# Patient Record
Sex: FEMALE | Race: WHITE | Employment: PART TIME | URBAN - METROPOLITAN AREA
[De-identification: names, ages, dates, MRNs, and addresses within clinical notes are randomized per-mention and may not be internally consistent; named-entity substitution may affect disease eponyms.]

---

## 2020-07-26 ENCOUNTER — HOSPITAL ENCOUNTER (EMERGENCY)
Age: 50
Discharge: HOME | End: 2020-07-26
Payer: COMMERCIAL

## 2020-07-26 VITALS
HEART RATE: 86 BPM | SYSTOLIC BLOOD PRESSURE: 132 MMHG | DIASTOLIC BLOOD PRESSURE: 80 MMHG | OXYGEN SATURATION: 98 % | TEMPERATURE: 98.7 F | RESPIRATION RATE: 16 BRPM

## 2020-07-26 PROCEDURE — 99282 EMERGENCY DEPT VISIT SF MDM: CPT

## 2020-07-26 RX ORDER — PREDNISONE 20 MG/1
TABLET ORAL
Qty: 18 TABLET | Refills: 0 | Status: SHIPPED | OUTPATIENT
Start: 2020-07-26 | End: 2020-08-05

## 2020-07-26 ASSESSMENT — PAIN DESCRIPTION - LOCATION: LOCATION: OTHER (COMMENT)

## 2020-07-26 ASSESSMENT — PAIN SCALES - GENERAL: PAINLEVEL_OUTOF10: 10

## 2020-07-26 ASSESSMENT — PAIN DESCRIPTION - PAIN TYPE: TYPE: ACUTE PAIN

## 2020-07-26 ASSESSMENT — PAIN DESCRIPTION - DESCRIPTORS: DESCRIPTORS: BURNING;THROBBING

## 2020-07-26 ASSESSMENT — PAIN DESCRIPTION - FREQUENCY: FREQUENCY: CONTINUOUS

## 2020-07-26 NOTE — ED PROVIDER NOTES
[facility]     Department of Emergency Medicine   ED  Provider Note  Admit Date/RoomTime: 7/26/2020  1:18 PM  ED Room: 03/03  Chief Complaint   Rash (bilateral hands and feet x 6 months)    History of Present Illness   Source of history provided by:  patient. History/Exam Limitations: none. Rylee Lutz is a 51 y.o. old female with has a past medical history of: History reviewed. No pertinent past medical history. presents to the emergency department by private vehicle, for complaint of gradual onset, still present scaly erythematous itchy rash area on palms and soles of the feet which began a few month(s) prior to arrival.  The symptoms were caused by unknown cause and appears to be seasonal..  Since onset the symptoms have been persistent. Prior history of similar episodes: Yes. Her symptoms are associated with nothing additional and not relieved by topical creams alone. .  She denies any difficulty breathing, difficulty swallowing, wheezing, throat tightness, stridor or itching. ROS    Pertinent positives and negatives are stated within HPI, all other systems reviewed and are negative. Past Surgical History:   Procedure Laterality Date    THROAT SURGERY      nodule removed   Social History:  reports that she has been smoking cigarettes. She has been smoking about 1.50 packs per day. She has never used smokeless tobacco. She reports current drug use. Drug: Marijuana. She reports that she does not drink alcohol. Family History: family history is not on file. Allergies: Pcn [penicillins]    Physical Exam           ED Triage Vitals [07/26/20 1314]   BP Temp Temp Source Pulse Resp SpO2 Height Weight   132/80 98.7 °F (37.1 °C) Temporal 86 16 98 % -- --     Oxygen Saturation Interpretation: Normal.     Constitutional:  Alert, development consistent with age. HEENT:  NC/NT. Airway patent. Eyes:  PERRL, EOMI, no discharge. Ears:  TMs without perforation, injection, or bulging.   External (DELTASONE) 20 MG TABLET    Sig.: Take 60mg  Po qd x 3 days, then 40mg po qd x3 days, then 20mg po qd x 3 days. QS x 9 days     Electronically signed by ISABELLE Lockett   DD: 7/26/20  **This report was transcribed using voice recognition software. Every effort was made to ensure accuracy; however, inadvertent computerized transcription errors may be present.   END OF ED PROVIDER NOTE       Yinka Ward, [address]  07/26/20 6917

## 2020-12-09 ENCOUNTER — HOSPITAL ENCOUNTER (EMERGENCY)
Age: 50
Discharge: HOME | End: 2020-12-09
Payer: COMMERCIAL

## 2020-12-09 VITALS
HEIGHT: 59 IN | RESPIRATION RATE: 18 BRPM | TEMPERATURE: 96.3 F | WEIGHT: 154 LBS | SYSTOLIC BLOOD PRESSURE: 164 MMHG | DIASTOLIC BLOOD PRESSURE: 90 MMHG | OXYGEN SATURATION: 98 % | HEART RATE: 93 BPM | BODY MASS INDEX: 31.04 KG/M2

## 2020-12-09 LAB — HCG(URINE) PREGNANCY TEST: NEGATIVE

## 2020-12-09 PROCEDURE — 81025 URINE PREGNANCY TEST: CPT

## 2020-12-09 PROCEDURE — 96372 THER/PROPH/DIAG INJ SC/IM: CPT

## 2020-12-09 PROCEDURE — 99283 EMERGENCY DEPT VISIT LOW MDM: CPT

## 2020-12-09 PROCEDURE — 6370000000 HC RX 637 (ALT 250 FOR IP): Performed by: NURSE PRACTITIONER

## 2020-12-09 PROCEDURE — 6360000002 HC RX W HCPCS: Performed by: NURSE PRACTITIONER

## 2020-12-09 RX ORDER — CLINDAMYCIN HYDROCHLORIDE 150 MG/1
150 CAPSULE ORAL ONCE
Status: COMPLETED | OUTPATIENT
Start: 2020-12-09 | End: 2020-12-09

## 2020-12-09 RX ORDER — IBUPROFEN 600 MG/1
600 TABLET ORAL EVERY 8 HOURS PRN
Qty: 15 TABLET | Refills: 0 | Status: SHIPPED | OUTPATIENT
Start: 2020-12-09 | End: 2021-06-07

## 2020-12-09 RX ORDER — HYDROCODONE BITARTRATE AND ACETAMINOPHEN 5; 325 MG/1; MG/1
1 TABLET ORAL EVERY 8 HOURS PRN
Qty: 6 TABLET | Refills: 0 | Status: SHIPPED | OUTPATIENT
Start: 2020-12-09 | End: 2020-12-11

## 2020-12-09 RX ORDER — CHLORHEXIDINE GLUCONATE 0.12 MG/ML
15 RINSE ORAL 2 TIMES DAILY
Qty: 473 ML | Refills: 0 | Status: SHIPPED | OUTPATIENT
Start: 2020-12-09 | End: 2021-06-06

## 2020-12-09 RX ORDER — KETOROLAC TROMETHAMINE 30 MG/ML
30 INJECTION, SOLUTION INTRAMUSCULAR; INTRAVENOUS ONCE
Status: COMPLETED | OUTPATIENT
Start: 2020-12-09 | End: 2020-12-09

## 2020-12-09 RX ORDER — CLINDAMYCIN HYDROCHLORIDE 300 MG/1
300 CAPSULE ORAL 3 TIMES DAILY
Qty: 21 CAPSULE | Refills: 0 | Status: SHIPPED | OUTPATIENT
Start: 2020-12-09 | End: 2020-12-16

## 2020-12-09 RX ADMIN — CLINDAMYCIN HYDROCHLORIDE 150 MG: 150 CAPSULE ORAL at 20:45

## 2020-12-09 RX ADMIN — KETOROLAC TROMETHAMINE 30 MG: 30 INJECTION, SOLUTION INTRAMUSCULAR at 20:45

## 2020-12-09 ASSESSMENT — PAIN DESCRIPTION - DESCRIPTORS: DESCRIPTORS: ACHING

## 2020-12-09 ASSESSMENT — PAIN DESCRIPTION - ORIENTATION: ORIENTATION: RIGHT

## 2020-12-09 ASSESSMENT — PAIN DESCRIPTION - PAIN TYPE: TYPE: ACUTE PAIN

## 2020-12-09 ASSESSMENT — PAIN SCALES - GENERAL
PAINLEVEL_OUTOF10: 9
PAINLEVEL_OUTOF10: 10

## 2020-12-09 ASSESSMENT — PAIN DESCRIPTION - LOCATION: LOCATION: TEETH

## 2020-12-09 ASSESSMENT — PAIN DESCRIPTION - PROGRESSION: CLINICAL_PROGRESSION: GRADUALLY WORSENING

## 2020-12-09 ASSESSMENT — PAIN DESCRIPTION - FREQUENCY: FREQUENCY: CONTINUOUS

## 2020-12-10 NOTE — ED PROVIDER NOTES
[address] [facility]  Department of Emergency Medicine   ED  Encounter Note  Admit Date/RoomTime: 2020  7:44 PM  ED Room:     NAME: Tangela Irizarry  : 1970  MRN: [id number]     Chief Complaint:  Dental Pain (X 2 days to right upper, states her tooth broke off last week but states she did not have pain at that time.  )    History of Present Illness        Barbara Kay is a 51 y.o. old female who presents to the emergency department for complaint of right upper dental pain. She reports that her tooth broke 1 week ago and over the past 2 days she has had increased pain. No fever or chills. No nausea or vomiting. Denies dysphagia. She reports that she has been using ibuprofen with no improvement. Denies any recent use of antibiotics. Reports that she does not have a private dentist to see. No sore throat, no neck pain or stiffness, no chest pain or shortness of breath. Since onset the symptoms have been waxing and waning and mild-moderate in severity. Worsened by  chewing and improved by nothing. Associated Signs & Symptoms:  Facial pain. Onset:       Spontaneous:   no.     Following Trauma:   no.     Previous Caries:   yes. Recent Dental Procedure:   no.     ROS   Pertinent positives and negatives are stated within HPI, all other systems reviewed and are negative. Past Medical History:  has no past medical history on file. Surgical History:  has a past surgical history that includes Throat surgery. Social History:  reports that she has been smoking cigarettes. She has been smoking about 1.50 packs per day. She has never used smokeless tobacco. She reports current drug use. Drug: Marijuana. She reports that she does not drink alcohol. Family History: family history is not on file.      Allergies: Pcn [penicillins]    Physical Exam   Oxygen Saturation Interpretation: Normal.        ED Triage Vitals   BP Temp Temp Source Pulse Resp obvious abscess formation. No trismus or concern for Stephen angina. No fever or chills. No nausea or vomiting. She was started on clindamycin due to penicillin allergy. She was given dental clinic contact information and instructed to follow-up as a walk-in. She verbalized understanding agrees with plan. She remained hemodynamically stable, nontoxic, and appropriate for outpatient management. Is to return for any new, change, worsening symptoms or concerns. At this time the patient is without objective evidence of an acute process requiring hospitalization or inpatient management. They have remained hemodynamically stable throughout their entire ED visit and are stable for discharge with outpatient follow-up. The plan has been discussed in detail and they are aware of the specific conditions for emergent return, as well as the importance of follow-up. Counseling:   I discussed todays visit summary with patient,  in addition to providing specific details for the plan of care and counseling regarding the diagnosis and prognosis. Questions are answered at this time and they are agreeable with the plan. Assessment      1. Pain, dental    2. Dental caries      Plan   Discharge to home. Patient condition is good    New Medications     New Prescriptions    No medications on file     Electronically signed by TODD Lynch CNP   DD: 12/9/20  **This report was transcribed using voice recognition software. Every effort was made to ensure accuracy; however, inadvertent computerized transcription errors may be present.   END OF ED PROVIDER NOTE      TODD Lynch CNP  12/09/20 4730

## 2021-06-06 ENCOUNTER — APPOINTMENT (OUTPATIENT)
Dept: GENERAL RADIOLOGY | Age: 51
DRG: 024 | End: 2021-06-06
Payer: COMMERCIAL

## 2021-06-06 ENCOUNTER — APPOINTMENT (OUTPATIENT)
Dept: CT IMAGING | Age: 51
DRG: 024 | End: 2021-06-06
Payer: COMMERCIAL

## 2021-06-06 ENCOUNTER — HOSPITAL ENCOUNTER (EMERGENCY)
Age: 51
Discharge: HOME | DRG: 024 | End: 2021-06-06
Attending: EMERGENCY MEDICINE
Payer: COMMERCIAL

## 2021-06-06 VITALS
HEART RATE: 96 BPM | TEMPERATURE: 97.7 F | BODY MASS INDEX: 32.25 KG/M2 | RESPIRATION RATE: 16 BRPM | OXYGEN SATURATION: 100 % | DIASTOLIC BLOOD PRESSURE: 98 MMHG | SYSTOLIC BLOOD PRESSURE: 182 MMHG | WEIGHT: 160 LBS | HEIGHT: 59 IN

## 2021-06-06 DIAGNOSIS — I10 HYPERTENSION, UNSPECIFIED TYPE: ICD-10-CM

## 2021-06-06 DIAGNOSIS — I63.02 CEREBROVASCULAR ACCIDENT (CVA) DUE TO THROMBOSIS OF BASILAR ARTERY (HCC): Primary | ICD-10-CM

## 2021-06-06 DIAGNOSIS — I77.9 CAROTID ARTERY DISEASE, UNSPECIFIED LATERALITY (HCC): ICD-10-CM

## 2021-06-06 DIAGNOSIS — D68.9 COAGULOPATHY (HCC): ICD-10-CM

## 2021-06-06 LAB
ALBUMIN SERPL-MCNC: 4.3 G/DL (ref 3.5–5.2)
ALP BLD-CCNC: 82 U/L (ref 35–104)
ALT SERPL-CCNC: 16 U/L (ref 0–32)
AMYLASE: 53 U/L (ref 20–100)
ANION GAP SERPL CALCULATED.3IONS-SCNC: 12 MMOL/L (ref 7–16)
AST SERPL-CCNC: 14 U/L (ref 0–31)
BILIRUB SERPL-MCNC: 0.2 MG/DL (ref 0–1.2)
BILIRUBIN DIRECT: 0.2 MG/DL (ref 0–0.3)
BILIRUBIN, INDIRECT: 0 MG/DL (ref 0–1)
BUN BLDV-MCNC: 11 MG/DL (ref 6–20)
CALCIUM SERPL-MCNC: 9.3 MG/DL (ref 8.6–10.2)
CHLORIDE BLD-SCNC: 105 MMOL/L (ref 98–107)
CK MB: 1 NG/ML (ref 0–4.3)
CO2: 23 MMOL/L (ref 22–29)
CREAT SERPL-MCNC: 0.6 MG/DL (ref 0.5–1)
GFR AFRICAN AMERICAN: >60
GFR NON-AFRICAN AMERICAN: >60 ML/MIN/1.73
GLUCOSE BLD-MCNC: 104 MG/DL (ref 74–99)
HCT VFR BLD CALC: 48.1 % (ref 34–48)
HEMOGLOBIN: 16.7 G/DL (ref 11.5–15.5)
INR BLD: >10
LIPASE: 16 U/L (ref 13–60)
MAGNESIUM: 1.9 MG/DL (ref 1.6–2.6)
MCH RBC QN AUTO: 31.2 PG (ref 26–35)
MCHC RBC AUTO-ENTMCNC: 34.7 % (ref 32–34.5)
MCV RBC AUTO: 89.7 FL (ref 80–99.9)
METER GLUCOSE: 110 MG/DL (ref 74–99)
PDW BLD-RTO: 12.2 FL (ref 11.5–15)
PLATELET # BLD: 235 E9/L (ref 130–450)
PMV BLD AUTO: 10.7 FL (ref 7–12)
POTASSIUM SERPL-SCNC: 3.8 MMOL/L (ref 3.5–5)
PRO-BNP: 145 PG/ML (ref 0–125)
PROTHROMBIN TIME: >120 SEC (ref 9.3–12.4)
RBC # BLD: 5.36 E12/L (ref 3.5–5.5)
SODIUM BLD-SCNC: 140 MMOL/L (ref 132–146)
TOTAL CK: 70 U/L (ref 20–180)
TOTAL PROTEIN: 8 G/DL (ref 6.4–8.3)
TROPONIN, HIGH SENSITIVITY: <6 NG/L (ref 0–9)
WBC # BLD: 12.1 E9/L (ref 4.5–11.5)

## 2021-06-06 PROCEDURE — 2580000003 HC RX 258: Performed by: RADIOLOGY

## 2021-06-06 PROCEDURE — 70498 CT ANGIOGRAPHY NECK: CPT

## 2021-06-06 PROCEDURE — 71045 X-RAY EXAM CHEST 1 VIEW: CPT

## 2021-06-06 PROCEDURE — 93005 ELECTROCARDIOGRAM TRACING: CPT | Performed by: EMERGENCY MEDICINE

## 2021-06-06 PROCEDURE — 70496 CT ANGIOGRAPHY HEAD: CPT

## 2021-06-06 PROCEDURE — 6360000004 HC RX CONTRAST MEDICATION: Performed by: RADIOLOGY

## 2021-06-06 PROCEDURE — 99283 EMERGENCY DEPT VISIT LOW MDM: CPT

## 2021-06-06 RX ORDER — SODIUM CHLORIDE 0.9 % (FLUSH) 0.9 %
10 SYRINGE (ML) INJECTION PRN
Status: COMPLETED | OUTPATIENT
Start: 2021-06-06 | End: 2021-06-06

## 2021-06-06 RX ADMIN — SODIUM CHLORIDE, PRESERVATIVE FREE 10 ML: 5 INJECTION INTRAVENOUS at 20:24

## 2021-06-06 RX ADMIN — IOPAMIDOL 75 ML: 755 INJECTION, SOLUTION INTRAVENOUS at 20:24

## 2021-06-06 ASSESSMENT — ENCOUNTER SYMPTOMS
BACK PAIN: 0
EYE REDNESS: 0
COUGH: 0
VOMITING: 0
EYE DISCHARGE: 0
EYE PAIN: 0
ABDOMINAL DISTENTION: 0
SINUS PRESSURE: 0
WHEEZING: 0
DIARRHEA: 0
SHORTNESS OF BREATH: 0
NAUSEA: 0
SORE THROAT: 0

## 2021-06-06 NOTE — ED PROVIDER NOTES
PATIENT HAD SLURRED SPEECH GREATER THAN 24 HOURS AGO COMPLETELY RESOLVED NOW NO HEADACHE NO FEVER NO VOMITING SMOKES NO ALCOHOL    The history is provided by the patient. Review of Systems   Constitutional: Negative for chills and fever. HENT: Negative for ear pain, sinus pressure and sore throat. Eyes: Negative for pain, discharge and redness. Respiratory: Negative for cough, shortness of breath and wheezing. Cardiovascular: Negative for chest pain. Gastrointestinal: Negative for abdominal distention, diarrhea, nausea and vomiting. Genitourinary: Negative for dysuria and frequency. Musculoskeletal: Negative for arthralgias and back pain. Skin: Negative for rash and wound. Neurological: Positive for speech difficulty. Negative for weakness and headaches. Hematological: Negative for adenopathy. All other systems reviewed and are negative. Physical Exam  Vitals and nursing note reviewed. Constitutional:       Appearance: She is well-developed. HENT:      Head: Normocephalic and atraumatic. Eyes:      Pupils: Pupils are equal, round, and reactive to light. Cardiovascular:      Rate and Rhythm: Normal rate and regular rhythm. Heart sounds: Normal heart sounds. No murmur heard. Pulmonary:      Effort: Pulmonary effort is normal. No respiratory distress. Breath sounds: Normal breath sounds. No wheezing or rales. Abdominal:      General: Bowel sounds are normal.      Palpations: Abdomen is soft. Tenderness: There is no abdominal tenderness. There is no guarding or rebound. Musculoskeletal:      Cervical back: Normal range of motion and neck supple. Skin:     General: Skin is warm and dry. Neurological:      Mental Status: She is alert and oriented to person, place, and time.           Procedures     [facility]         --------------------------------------------- PAST HISTORY ---------------------------------------------  Past Medical History:  has no past mg/dL   Lipase   Result Value Ref Range    Lipase 16 13 - 60 U/L   Amylase   Result Value Ref Range    Amylase 53 20 - 100 U/L   Magnesium   Result Value Ref Range    Magnesium 1.9 1.6 - 2.6 mg/dL   Brain Natriuretic Peptide   Result Value Ref Range    Pro- (H) 0 - 125 pg/mL   Protime-INR   Result Value Ref Range    Protime >120.0 (H) 9.3 - 12.4 sec    INR >10.0 (HH)    Serum Drug Screen   Result Value Ref Range    Ethanol Lvl <10 mg/dL    Acetaminophen Level <5.0 (L) 10.0 - 33.8 mcg/mL    Salicylate, Serum <5.4 0.0 - 30.0 mg/dL   POCT Glucose   Result Value Ref Range    Meter Glucose 110 (H) 74 - 99 mg/dL       RADIOLOGY:  XR CHEST PORTABLE   Final Result   No pneumonia or pleural effusion. CTA HEAD W CONTRAST   Final Result   1. No acute intracranial hemorrhage. 2.  Lacunar areas of stroke involving head of the caudate nucleus on the left   and left basal ganglia of uncertain chronicity. MRI brain may be helpful for   further evaluation. 3.  Severe focal stenosis at origin of left internal carotid artery of 90% or   greater. CTA NECK W CONTRAST   Final Result   1. No acute intracranial hemorrhage. 2.  Lacunar areas of stroke involving head of the caudate nucleus on the left   and left basal ganglia of uncertain chronicity. MRI brain may be helpful for   further evaluation. 3.  Severe focal stenosis at origin of left internal carotid artery of 90% or   greater. EKG:  This EKG is signed and interpreted by me. Rate: 65  Rhythm: Sinus  Interpretation: no acute changes  Comparison: no previous EKG available      ------------------------- NURSING NOTES AND VITALS REVIEWED ---------------------------   The nursing notes within the ED encounter and vital signs as below have been reviewed.    BP (!) 182/98   Pulse 96   Temp 97.7 °F (36.5 °C) (Infrared)   Resp 16   Ht 4' 11\" (1.499 m)   Wt 160 lb (72.6 kg)   SpO2 100%   BMI 32.32 kg/m²   Oxygen Saturation Interpretation: Normal      ------------------------------------------ PROGRESS NOTES ------------------------------------------     Consultations:      Counseling:   I have spoken with the patient and discussed todays results, in addition to providing specific details for the plan of care and counseling regarding the diagnosis and prognosis. Their questions are answered at this time and they are agreeable with the plan.      --------------------------------- ADDITIONAL PROVIDER NOTES ---------------------------------     This patient has chosen to leave against medical advice. I have personally explained to them that choosing to do so may result in permanent bodily harm or death. I discussed at length that without further evaluation and monitoring there may be unforeseen circumstances and deterioration resulting in permanent bodily harm or death as a result of their choice. They are alert, oriented, and competent at this time. They state that they are aware of the serious risks as explained, but they continue to wish to leave against medical advice. In light of their decision to leave against medical advice, follow-up has been arranged and they are aware of the importance of following up as instructed. They have been advised that they should return to the ED immediately if they change their mind at any time, or if their condition begins to change or worsen. PATIENT DID NOT WANT TO WAIT FOR REPEAT INR    This patient's ED course included: a personal history and physicial examination, re-evaluation prior to disposition, multiple bedside re-evaluations, IV medications, cardiac monitoring, continuous pulse oximetry and complex medical decision making and emergency management    This patient has remained hemodynamically stable during their ED course. This patient has chosen to leave against medical advice.   I have personally explained to them that choosing to do so may result in permanent bodily harm or death.  I discussed at length that without further evaluation and monitoring there may be unforeseen circumstances and deterioration resulting in permanent bodily harm or death as a result of their choice. They are alert, oriented, and competent at this time. They state that they are aware of the serious risks as explained, but they continue to wish to leave against medical advice. In light of their decision to leave against medical advice, follow-up has been arranged and they are aware of the importance of following up as instructed. They have been advised that they should return to the ED immediately if they change their mind at any time, or if their condition begins to change or worsen. TOLD HAD STROKE AND CAROTID BLOCKAGE LEXI RISK OF SEVERE MORBIDITY MORTALITY A AND O TIMES 3 SOBER ACCEPTS ALL RISK  TOLD CAN  AND SHOULD BE ADMITTED ASAP. TOLD NOT TO DRIVE ALSO DID NOT WANT TO WAIT FOR REPEA INR  Critical Care: 35  NIH SCORE 0 and greater than 24 hours ago not a TPA candidate     --------------------------------- IMPRESSION AND DISPOSITION ---------------------------------    IMPRESSION  1. 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 Carotid artery disease, unspecified laterality ([facility]    3. Hypertension, unspecified type    4.  [facility]        DISPOSITION  Disposition: Other Disposition: Left AMA  Patient condition is stable                Nancy Rowland MD  06/06/21 2036

## 2021-06-06 NOTE — ED NOTES
Name: Beverley Watson  : 1970  MRN: [id number]    Date: 2021    Benefits of immediately proceeding with Radiology exam outweigh the risks and therefore the following is being waived:      [x] Pregnancy test    [] Protocol for Iodine allergy    [] MRI questionnaire    [x] BUN/Creatinine        MD Nancy Mckenzie MD  21 6901

## 2021-06-06 NOTE — ED NOTES
[facility] Carolin Lee, [facility]  06/06/21 6747

## 2021-06-07 LAB
ABO/RH: NORMAL
ACETAMINOPHEN LEVEL: <5 MCG/ML (ref 10–30)
ALBUMIN SERPL-MCNC: 4.3 G/DL (ref 3.5–5.2)
ALP BLD-CCNC: 81 U/L (ref 35–104)
ALT SERPL-CCNC: 17 U/L (ref 0–32)
AMPHETAMINE SCREEN, URINE: NOT DETECTED
ANION GAP SERPL CALCULATED.3IONS-SCNC: 12 MMOL/L (ref 7–16)
ANTIBODY SCREEN: NORMAL
APTT: 31.4 SEC (ref 24.5–35.1)
AST SERPL-CCNC: 14 U/L (ref 0–31)
BARBITURATE SCREEN URINE: NOT DETECTED
BENZODIAZEPINE SCREEN, URINE: NOT DETECTED
BILIRUB SERPL-MCNC: 0.3 MG/DL (ref 0–1.2)
BUN BLDV-MCNC: 8 MG/DL (ref 6–20)
CALCIUM SERPL-MCNC: 9.7 MG/DL (ref 8.6–10.2)
CANNABINOID SCREEN URINE: POSITIVE
CHLORIDE BLD-SCNC: 101 MMOL/L (ref 98–107)
CO2: 25 MMOL/L (ref 22–29)
COCAINE METABOLITE SCREEN URINE: NOT DETECTED
CREAT SERPL-MCNC: 0.6 MG/DL (ref 0.5–1)
EKG ATRIAL RATE: 96 BPM
EKG P AXIS: 30 DEGREES
EKG P-R INTERVAL: 156 MS
EKG Q-T INTERVAL: 350 MS
EKG QRS DURATION: 70 MS
EKG QTC CALCULATION (BAZETT): 442 MS
EKG R AXIS: 7 DEGREES
EKG T AXIS: 32 DEGREES
EKG VENTRICULAR RATE: 96 BPM
ETHANOL: <10 MG/DL (ref 0–0.08)
FENTANYL SCREEN, URINE: NOT DETECTED
GFR AFRICAN AMERICAN: >60
GFR NON-AFRICAN AMERICAN: >60 ML/MIN/1.73
GLUCOSE BLD-MCNC: 188 MG/DL (ref 74–99)
HCT VFR BLD CALC: 48.2 % (ref 34–48)
HEMOGLOBIN: 16.4 G/DL (ref 11.5–15.5)
INR BLD: 1.1
Lab: ABNORMAL
MCH RBC QN AUTO: 30.8 PG (ref 26–35)
MCHC RBC AUTO-ENTMCNC: 34 % (ref 32–34.5)
MCV RBC AUTO: 90.4 FL (ref 80–99.9)
METHADONE SCREEN, URINE: NOT DETECTED
OPIATE SCREEN URINE: NOT DETECTED
OXYCODONE URINE: NOT DETECTED
PDW BLD-RTO: 12.2 FL (ref 11.5–15)
PHENCYCLIDINE SCREEN URINE: NOT DETECTED
PLATELET # BLD: 238 E9/L (ref 130–450)
PMV BLD AUTO: 11.1 FL (ref 7–12)
POTASSIUM SERPL-SCNC: 3.5 MMOL/L (ref 3.5–5)
PROTHROMBIN TIME: 12.4 SEC (ref 9.3–12.4)
RBC # BLD: 5.33 E12/L (ref 3.5–5.5)
SALICYLATE, SERUM: <0.3 MG/DL (ref 0–30)
SODIUM BLD-SCNC: 138 MMOL/L (ref 132–146)
TOTAL PROTEIN: 7.8 G/DL (ref 6.4–8.3)
TRICYCLIC ANTIDEPRESSANTS SCREEN SERUM: NEGATIVE NG/ML
TROPONIN, HIGH SENSITIVITY: <6 NG/L (ref 0–9)
WBC # BLD: 12.5 E9/L (ref 4.5–11.5)

## 2021-06-07 PROCEDURE — 85730 THROMBOPLASTIN TIME PARTIAL: CPT

## 2021-06-07 PROCEDURE — 93010 ELECTROCARDIOGRAM REPORT: CPT | Performed by: INTERNAL MEDICINE

## 2021-06-07 PROCEDURE — 86850 RBC ANTIBODY SCREEN: CPT

## 2021-06-07 PROCEDURE — 93005 ELECTROCARDIOGRAM TRACING: CPT | Performed by: NURSE PRACTITIONER

## 2021-06-07 PROCEDURE — 84484 ASSAY OF TROPONIN QUANT: CPT

## 2021-06-07 PROCEDURE — 85610 PROTHROMBIN TIME: CPT

## 2021-06-07 PROCEDURE — 80053 COMPREHEN METABOLIC PANEL: CPT

## 2021-06-07 PROCEDURE — 86900 BLOOD TYPING SEROLOGIC ABO: CPT

## 2021-06-07 PROCEDURE — 85027 COMPLETE CBC AUTOMATED: CPT

## 2021-06-07 PROCEDURE — 86901 BLOOD TYPING SEROLOGIC RH(D): CPT

## 2021-06-07 ASSESSMENT — ENCOUNTER SYMPTOMS
ABDOMINAL DISTENTION: 0
VOMITING: 0
ABDOMINAL PAIN: 0
COUGH: 0
PHOTOPHOBIA: 0
CHEST TIGHTNESS: 0
NAUSEA: 0
DIARRHEA: 0
SHORTNESS OF BREATH: 0

## 2021-06-07 NOTE — ED NOTES
Instructions provided and questions answered. Iv disconnected, site wnl.        Elizabeth Gardner RN  06/06/21 1446

## 2021-06-08 ENCOUNTER — APPOINTMENT (OUTPATIENT)
Dept: CT IMAGING | Age: 51
DRG: 024 | End: 2021-06-08
Payer: COMMERCIAL

## 2021-06-08 ENCOUNTER — HOSPITAL ENCOUNTER (INPATIENT)
Age: 51
LOS: 2 days | Discharge: HOME HEALTH | DRG: 024 | End: 2021-06-11
Attending: EMERGENCY MEDICINE | Admitting: INTERNAL MEDICINE
Payer: COMMERCIAL

## 2021-06-08 DIAGNOSIS — G89.18 ACUTE POSTOPERATIVE PAIN: ICD-10-CM

## 2021-06-08 DIAGNOSIS — I63.9 CEREBROVASCULAR ACCIDENT (CVA), UNSPECIFIED MECHANISM (HCC): Primary | ICD-10-CM

## 2021-06-08 LAB
EKG ATRIAL RATE: 103 BPM
EKG P AXIS: 65 DEGREES
EKG P-R INTERVAL: 158 MS
EKG Q-T INTERVAL: 338 MS
EKG QRS DURATION: 72 MS
EKG QTC CALCULATION (BAZETT): 442 MS
EKG R AXIS: 47 DEGREES
EKG T AXIS: 45 DEGREES
EKG VENTRICULAR RATE: 103 BPM
HCG QUALITATIVE: NEGATIVE

## 2021-06-08 PROCEDURE — 99253 IP/OBS CNSLTJ NEW/EST LOW 45: CPT | Performed by: PSYCHIATRY & NEUROLOGY

## 2021-06-08 PROCEDURE — G0378 HOSPITAL OBSERVATION PER HR: HCPCS

## 2021-06-08 PROCEDURE — 6370000000 HC RX 637 (ALT 250 FOR IP): Performed by: INTERNAL MEDICINE

## 2021-06-08 PROCEDURE — 93010 ELECTROCARDIOGRAM REPORT: CPT | Performed by: INTERNAL MEDICINE

## 2021-06-08 PROCEDURE — 99253 IP/OBS CNSLTJ NEW/EST LOW 45: CPT | Performed by: SURGERY

## 2021-06-08 PROCEDURE — 6370000000 HC RX 637 (ALT 250 FOR IP): Performed by: STUDENT IN AN ORGANIZED HEALTH CARE EDUCATION/TRAINING PROGRAM

## 2021-06-08 PROCEDURE — 99284 EMERGENCY DEPT VISIT MOD MDM: CPT

## 2021-06-08 PROCEDURE — 84703 CHORIONIC GONADOTROPIN ASSAY: CPT

## 2021-06-08 PROCEDURE — 70450 CT HEAD/BRAIN W/O DYE: CPT

## 2021-06-08 PROCEDURE — 6370000000 HC RX 637 (ALT 250 FOR IP): Performed by: PSYCHIATRY & NEUROLOGY

## 2021-06-08 RX ORDER — CLOPIDOGREL BISULFATE 75 MG/1
75 TABLET ORAL DAILY
Status: DISCONTINUED | OUTPATIENT
Start: 2021-06-08 | End: 2021-06-11 | Stop reason: HOSPADM

## 2021-06-08 RX ORDER — ATORVASTATIN CALCIUM 40 MG/1
40 TABLET, FILM COATED ORAL NIGHTLY
Status: DISCONTINUED | OUTPATIENT
Start: 2021-06-08 | End: 2021-06-11 | Stop reason: HOSPADM

## 2021-06-08 RX ORDER — ASPIRIN 81 MG/1
81 TABLET, CHEWABLE ORAL ONCE
Status: COMPLETED | OUTPATIENT
Start: 2021-06-08 | End: 2021-06-08

## 2021-06-08 RX ORDER — ASPIRIN 81 MG/1
81 TABLET, CHEWABLE ORAL DAILY
Status: DISCONTINUED | OUTPATIENT
Start: 2021-06-08 | End: 2021-06-11 | Stop reason: HOSPADM

## 2021-06-08 RX ADMIN — CLOPIDOGREL 75 MG: 75 TABLET, FILM COATED ORAL at 16:33

## 2021-06-08 RX ADMIN — ASPIRIN 81 MG CHEWABLE TABLET 81 MG: 81 TABLET CHEWABLE at 05:27

## 2021-06-08 RX ADMIN — ASPIRIN 81 MG CHEWABLE TABLET 81 MG: 81 TABLET CHEWABLE at 16:33

## 2021-06-08 RX ADMIN — ATORVASTATIN CALCIUM 40 MG: 40 TABLET, FILM COATED ORAL at 20:36

## 2021-06-08 ASSESSMENT — PAIN SCALES - GENERAL
PAINLEVEL_OUTOF10: 0

## 2021-06-08 NOTE — ED NOTES
Bed: 18B-18  Expected date:   Expected time:   Means of arrival:   Comments:  Hilton Pinon RN  06/08/21 8619

## 2021-06-08 NOTE — CONSULTS
Hill Pavon is a 53 y.o. female       Chief Complaint   Patient presents with    Altered Mental Status     pt was seen yesterday at [facility] and diagnosed with a CVA. pt had slurred speech 2 nights ago. pt states she signed out AMA yesterday to take care of her mother. HPI:  53year old woman present with episode of speech disturbance she describes as knowing what she wanted to say but being unable to get the words out. She denies any associated motor or sensory symptoms. She feels back to normal now. CT head showed lacunar infarct of unclear chronicity. CTAs showed 90% stenosis of the origin of the left ICA. HPI  Prior to Visit Medications    Not on File     Social History     Tobacco Use    Smoking status: Current Every Day Smoker     Packs/day: 1.50     Types: Cigarettes    Smokeless tobacco: Never Used   Substance Use Topics    Alcohol use: No    Drug use: Yes     Types: Marijuana     No family history on file. Past Surgical History:   Procedure Laterality Date    THROAT SURGERY      nodule removed     No past medical history on file. Review of Systems    As stated above all others negative         Objective:   BP (!) 148/85   Pulse 76   Temp 97.6 °F (36.4 °C) (Oral)   Resp 18   Ht 4' 11\" (1.499 m)   Wt 162 lb (73.5 kg)   SpO2 98%   Breastfeeding No   BMI 32.72 kg/m²     Physical Exam  Constitutional:       General: She is not in acute distress. Appearance: She is not ill-appearing. HENT:      Head: Normocephalic and atraumatic. Mouth/Throat:      Mouth: Mucous membranes are moist.      Pharynx: Oropharynx is clear. Eyes:      General: Lids are normal.      Extraocular Movements: Extraocular movements intact. Conjunctiva/sclera: Conjunctivae normal.      Pupils: Pupils are equal, round, and reactive to light. Cardiovascular:      Rate and Rhythm: Regular rhythm. Pulmonary:      Breath sounds: Normal breath sounds.    Abdominal:      Palpations: Abdomen 06/07/2021    CO2 25 06/07/2021    BUN 8 06/07/2021    CREATININE 0.6 06/07/2021    GFRAA >60 06/07/2021    LABGLOM >60 06/07/2021    GLUCOSE 188 06/07/2021    PROT 7.8 06/07/2021    LABALBU 4.3 06/07/2021    CALCIUM 9.7 06/07/2021    BILITOT 0.3 06/07/2021    ALKPHOS 81 06/07/2021    AST 14 06/07/2021    ALT 17 06/07/2021     HgBA1c:  No results found for: LABA1C  FLP:  No results found for: TRIG, HDL, LDLCALC, LDLDIRECT, LABVLDL      Impression   1.  No acute intracranial hemorrhage.       2.  Lacunar areas of stroke involving head of the caudate nucleus on the left   and left basal ganglia of uncertain chronicity.  MRI brain may be helpful for   further evaluation.       3.  Severe focal stenosis at origin of left internal carotid artery of 90% or   greater. I independently reviewed the labs and imaging studies at today's appointment. Assessment:     TIA likely secondary to carotid disease. Plan:   DAPT  High intensity statin   Vascular surgery consult  Permissive HTN.       Gwen Pinzon MD  4:32 PM  6/8/2021

## 2021-06-08 NOTE — CONSULTS
Vascular : Pt seen,chart,lab and x rays reviewed. Assessment:1. TIA with aphasia, resolved, happened yesterday associate with CTA evidence of greater than 80% left carotid stenosis, with evidence of old left lacunar infarct involving the basal ganglia    Plan: Discussed the patient, options, risks benefits and alternatives were explained, patient recommend complete cardiac evaluation, if stable, consider left carotid intervention    Patient was counseled to stop smoking completely    All her questions were answered    We will discuss with Dr. Grover Chowdhury tomorrow    Full consult to follow.     Thank you    Blaine Eugene MD

## 2021-06-08 NOTE — ED PROVIDER NOTES
Tom Sutton is a 53year old female without any known PMH who presented to ED with concerns for AMS. Patient began to have slurring of her speech over 24 hours ago. Patient presented to [facility] and was found to have areas of lacunar infarct on CT scan. It was recommended that patient stay for admission at that time however patient signed out AMA because she had to provide care for her mother. Patient returned to ED today for admission. Patient states she is still having mild slurring of her speech feels her symptoms have improved mildly. Patient does not have any known medical problems and does not take any medication daily. Patient denies any alcohol use. Patient is not on any anticoagulation. Patient did have an INR level drawn yesterday that showed a INR greater than 10. Patient denies any liver failure. Patient has fever, chills, nausea, vomiting, chest pain, shortness of breath. Patient symptoms of slurring speech been present for over 24 hours and are slightly improved. Patient symptoms are moderate in severity and constant. Nothing makes symptoms better or worse. Patient not tried anything for symptoms. Patient does not take any medication daily. The history is provided by the patient and medical records. Review of Systems   Constitutional: Negative for chills, diaphoresis, fatigue and fever. Eyes: Negative for photophobia and visual disturbance. Respiratory: Negative for cough, chest tightness and shortness of breath. Cardiovascular: Negative for chest pain, palpitations and leg swelling. Gastrointestinal: Negative for abdominal distention, abdominal pain, diarrhea, nausea and vomiting. Genitourinary: Negative for dysuria. Musculoskeletal: Negative for neck pain and neck stiffness. Skin: Negative for pallor and rash. Neurological: Positive for speech difficulty. Negative for dizziness, syncope, weakness, light-headedness and headaches. Psychiatric/Behavioral: Negative for confusion. Physical Exam  Vitals and nursing note reviewed. Constitutional:       General: She is not in acute distress. Appearance: Normal appearance. She is not ill-appearing. HENT:      Head: Normocephalic and atraumatic. Eyes:      General: No scleral icterus. Conjunctiva/sclera: Conjunctivae normal.      Pupils: Pupils are equal, round, and reactive to light. Cardiovascular:      Rate and Rhythm: Normal rate and regular rhythm. Pulmonary:      Effort: Pulmonary effort is normal.      Breath sounds: Normal breath sounds. Abdominal:      General: Bowel sounds are normal. There is no distension. Palpations: Abdomen is soft. Tenderness: There is no abdominal tenderness. There is no guarding or rebound. Musculoskeletal:      Cervical back: Normal range of motion and neck supple. No rigidity. No muscular tenderness. Right lower leg: No edema. Left lower leg: No edema. Skin:     General: Skin is warm and dry. Capillary Refill: Capillary refill takes less than 2 seconds. Coloration: Skin is not pale. Findings: No erythema or rash. Neurological:      Mental Status: She is alert and oriented to person, place, and time. Cranial Nerves: No cranial nerve deficit. Sensory: No sensory deficit. Motor: No weakness. Coordination: Coordination normal.      Gait: Gait normal.      Comments: NIH 1 for speech   No ataxia     Psychiatric:         Mood and Affect: Mood normal.          Procedures     MDM  Number of Diagnoses or Management Options  Cerebrovascular accident (CVA), unspecified mechanism ([facility] [address]  Diagnosis management comments: Nury Petit is a 53year old female who presented to ED with concerns for speech difficulty. Patient was found to have a CVA yesterday at [facility] urgent care but patient declined admission at this time and presented today for admission for CVA.   Patient CT head redemonstrated area of infarct. Patient will be admitted for CVA. Patient is still having speech difficulty at time of reevaluation has NIH of 1. Patient does not have any known medical history. Discussed results and plan with patient she is agreeable to admission. Patient given aspirin and will be admitted for CVA.          --------------------------------------------- PAST HISTORY ---------------------------------------------  Past Medical History:  has no past medical history on file. Past Surgical History:  has a past surgical history that includes Throat surgery. Social History:  reports that she has been smoking cigarettes. She has been smoking about 1.50 packs per day. She has never used smokeless tobacco. She reports current drug use. Drug: Marijuana. She reports that she does not drink alcohol. Family History: family history is not on file. The patients home medications have been reviewed.     Allergies: Pcn [penicillins]    -------------------------------------------------- RESULTS -------------------------------------------------    LABS:  Results for orders placed or performed during the hospital encounter of 06/08/21   CBC   Result Value Ref Range    WBC 12.5 (H) 4.5 - 11.5 E9/L    RBC 5.33 3.50 - 5.50 E12/L    Hemoglobin 16.4 (H) 11.5 - 15.5 g/dL    Hematocrit 48.2 (H) 34.0 - 48.0 %    MCV 90.4 80.0 - 99.9 fL    MCH 30.8 26.0 - 35.0 pg    MCHC 34.0 32.0 - 34.5 %    RDW 12.2 11.5 - 15.0 fL    Platelets 326 871 - 733 E9/L    MPV 11.1 7.0 - 12.0 fL   Comprehensive Metabolic Panel   Result Value Ref Range    Sodium 138 132 - 146 mmol/L    Potassium 3.5 3.5 - 5.0 mmol/L    Chloride 101 98 - 107 mmol/L    CO2 25 22 - 29 mmol/L    Anion Gap 12 7 - 16 mmol/L    Glucose 188 (H) 74 - 99 mg/dL    BUN 8 6 - 20 mg/dL    CREATININE 0.6 0.5 - 1.0 mg/dL    GFR Non-African American >60 >=60 mL/min/1.73    GFR African American >60     Calcium 9.7 8.6 - 10.2 mg/dL    Total Protein 7.8 6.4 - 8.3 g/dL changed    Counseling:  I have spoken with the patient and discussed todays results, in addition to providing specific details for the plan of care and counseling regarding the diagnosis and prognosis. Their questions are answered at this time and they are agreeable with the plan of admission.    --------------------------------- ADDITIONAL PROVIDER NOTES ---------------------------------  Consultations:  Spoke with Dr. Juan J Lewis. Discussed case. They will admit the patient. This patient's ED course included: a personal history and physicial examination, re-evaluation prior to disposition, multiple bedside re-evaluations, cardiac monitoring and complex medical decision making and emergency management    This patient has remained hemodynamically stable during their ED course. Diagnosis:  1. Cerebrovascular accident (CVA), unspecified mechanism ([address]        Disposition:  Patient's disposition: Admit to telemetry  Patient's condition is stable. Diallo Murphy MD  Resident  06/08/21 [id number]  ATTENDING PROVIDER ATTESTATION:     I have personally performed and/or participated in the history, exam, medical decision making, and procedures and agree with all pertinent clinical information. I have also reviewed and agree with the past medical, family and social history unless otherwise noted. I have discussed this patient in detail with the resident, and provided the instruction and education regarding stroke. My findings/Plan: I was the primary provider for patient patient presenting here because of slurred speech. Patient reports that this occurred about 24 hours prior to arrival.  Patient was seen at [facility] emergency department and had CT a of head as well as neck. Patient had signed out against advice there. Patient reports continuation of her slurred speech. She reports no numbness or tingling she reports no upper or lower extremity weakness.   Patient reporting no chest pain or shortness of breath. Patient reporting no fever chills or cough. Patient is awake alert oriented x3 she has no facial droop heart lung exam normal abdomen soft nontender she has normal strength in upper lower extremities as well. CTA of her head as well as neck noted and reviewed. Labs and EKG noted CT of the head shows lacunar infarct today. Patient remained stable here in the emergency room she was made aware of findings and plan.   We did speak to on-call internal medicine and patient will be admitted to monitored bed       Sunitha Vieyra MD  06/08/21 [id number] 128[facility] MD Judie  06/08/21 [id number]

## 2021-06-08 NOTE — CONSULTS
Chief Complaint: Patient seen for evaluation of left carotid stenosis and stroke      HPI: This patient, within consultation on 8 June, for the evaluation of abnormal CTA of the carotid revealed high-grade stenosis of the left internal carotid artery, of 90%, the testing being done because of history, of aphasia, difficulty to speak, noted by patient's fiancé the day before, went to [facility], emergency room, waited there for a while, subsequently because home situation, that she needs to take care of her mother, patient left the [facility] emergency room, and came to the main campus of [facility], underwent work-up including CTA of the carotids and CT of the head, revealed evidence of high-grade stenosis of left internal carotid artery of 90%, evidence of old lacunar infarct in the basal ganglia area noted and vascular services and neurology services were consulted    When I came to see the patient, patient tells me when she had the speech problem, she did not have any loss of vision or loss of function extremities, ankles and most of her speech has come back completely and was transient in nature      Patient denies any new focal lateralizing neurological symptoms like loss of speech, vision or loss of function of extremity since hospitalization    Patient denies any history of chest pain, palpitation, shortness of breath    Patient smokes about 2 packs/day    Patient can walk a few blocks, and denies any symptoms of rest pain    Allergies   Allergen Reactions    Pcn [Penicillins] Swelling       Current Facility-Administered Medications   Medication Dose Route Frequency Provider Last Rate Last Admin    aspirin chewable tablet 81 mg  81 mg Oral Daily Shawn Peguero MD   81 mg at 06/08/21 1633    atorvastatin (LIPITOR) tablet 40 mg  40 mg Oral Nightly Shawn Peguero MD        clopidogrel (PLAVIX) tablet 75 mg  75 mg Oral Daily Areli Jimenez MD   75 mg at 06/08/21 1633       No past medical history on file. Past Surgical History:   Procedure Laterality Date    THROAT SURGERY      nodule removed       No family history on file. Social History     Socioeconomic History    Marital status: Single     Spouse name: Not on file    Number of children: Not on file    Years of education: Not on file    Highest education level: Not on file   Occupational History    Not on file   Tobacco Use    Smoking status: Current Every Day Smoker     Packs/day: 1.50     Types: Cigarettes    Smokeless tobacco: Never Used   Substance and Sexual Activity    Alcohol use: No    Drug use: Yes     Types: Marijuana    Sexual activity: Not on file   Other Topics Concern    Not on file   Social History Narrative    Not on file     Social Determinants of Health     Financial Resource Strain:     Difficulty of Paying Living Expenses:    Food Insecurity:     Worried About Running Out of Food in the Last Year:     Ran Out of Food in the Last Year:    Transportation Needs:     Lack of Transportation (Medical):  Lack of Transportation (Non-Medical):    Physical Activity:     Days of Exercise per Week:     Minutes of Exercise per Session:    Stress:     Feeling of Stress :    Social Connections:     Frequency of Communication with Friends and Family:     Frequency of Social Gatherings with Friends and Family:     Attends Taoist Services:     Active Member of Clubs or Organizations:     Attends Club or Organization Meetings:     Marital Status:    Intimate Partner Violence:     Fear of Current or Ex-Partner:     Emotionally Abused:     Physically Abused:     Sexually Abused:        Review of Systems:  Skin:  No abnormal pigmentation or rash. Eyes:  No blurring, diplopia or vision loss. Ears/Nose/Throat:  No hearing loss or vertigo. Respiratory:  No cough, pleuritic chest pain, dyspnea, or wheezing. Cardiovascular: No angina, palpitations .   Hypertension    Gastrointestinal: No nausea or vomiting; no abdominal pain or rectal bleeding. Musculoskeletal:  No arthritis or weakness. Neurologic:  No paralysis, paresis, seizures or headaches. History of transient ischemic attack, with aphasia and speech impairment without any other neurological deficits or symptoms    Hematologic/Lymphatic/Immunologic:  No anemia, abnormal bleeding/bruising. Endocrine:  No heat or cold intolerance. No polyphagia, polydipsia or polyuria. Physical Exam:  BP (!) 148/85   Pulse 76   Temp 97.6 °F (36.4 °C) (Oral)   Resp 18   Ht 4' 11\" (1.499 m)   Wt 162 lb (73.5 kg)   SpO2 98%   Breastfeeding No   BMI 32.72 kg/m²   General appearance:  Alert, awake, oriented x 3. No distress. Skin:  Warm and dry. Head:  Normocephalic. No masses, lesions or tenderness. Eyes:  Conjunctivae appear normal; PERRL. Ears:  External ears normal.  Nose/Sinuses:  Septum midline, mucosa normal; no drainage. Oropharynx:  Clear, no exudate noted. Neck:  No jugular venous distention, lymphadenopathy or thyromegaly. No evidence of carotid bruit      Lungs:  Clear to ausculation bilaterally. No rhonchi, crackles, wheezes. Heart:  Regular rate and rhythm. No rub or murmur. .    Abdomen:  Soft, non-tender. No masses, organomegaly. Musculoskeletal: No joint effusions, tenderness swelling or warmth. Neuro: Speech is intact. Moving all extremities. No focal motor or sensory deficits. Extremities:  Both feet are warm to touch. The color of both feet is normal.          Pulses Right  Left    Brachial 3 3    Radial    3=normal   Femoral 2 2  2=diminished   Popliteal    1=barely palpable   Dorsalis pedis    0=absent   Posterior tibial    4=aneurysmal           Other pertinent information:1. The past medical records were reviewed.     2.    Lab Results   Component Value Date    WBC 12.5 (H) 06/07/2021    HGB 16.4 (H) 06/07/2021    HCT 48.2 (H) 06/07/2021    MCV 90.4 06/07/2021     06/07/2021 Lab Results   Component Value Date     06/07/2021    K 3.5 06/07/2021     06/07/2021    CO2 25 06/07/2021    BUN 8 06/07/2021    CREATININE 0.6 06/07/2021    GLUCOSE 188 (H) 06/07/2021    CALCIUM 9.7 06/07/2021    PROT 7.8 06/07/2021    LABALBU 4.3 06/07/2021    BILITOT 0.3 06/07/2021    ALKPHOS 81 06/07/2021    AST 14 06/07/2021    ALT 17 06/07/2021    LABGLOM >60 06/07/2021    GFRAA >60 06/07/2021     Lab Results   Component Value Date    APTT 31.4 06/07/2021      Lab Results   Component Value Date    INR 1.1 06/07/2021    INR >10.0 (HH) 06/06/2021    PROTIME 12.4 06/07/2021    PROTIME >120.0 (H) 06/06/2021        3. CT HEAD WO CONTRAST   Final Result   Grossly stable lacunar infarctions involving the left basal ganglia. Correlation with brain MRI could be considered to determine chronicity. MRI BRAIN WO CONTRAST    (Results Pending)     4. The history physical, neurology consultation notes were reviewed    5. The CT scan of the brain was personally reviewed, evidence of small lacunar infarct involving the basal ganglia noted, appears to be chronic in nature    6. The CTA of the carotids personally reviewed, approximately 80 to 90% focal stenosis of the origin of the left internal carotid artery noted    Assessment:    1. TIA, with speech impairment, transient associated with left internal carotid artery stenosis of 80 to 90%    2. CT scan evidence of old lacunar infarct involving left basal ganglia    3. Hypertension    4.   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 ([address]    Carotid artery disease, unspecified laterality ([address]    Hypertension    Coagulopathy ([address]    Acute cerebrovascular accident (CVA) ([address]            Plan:     I had a long and detailed discussion the patient, patient was informed of my interpretation of the work-up that was done so far, high-grade stenosis, symptomatic, left carotid, of 80 to 90%, focal, most likely the source of her symptoms of her aphasia in a right-handed person    Informed her, the CT scan had revealed evidence of old lacunar infarct    Will need to review the MRI of the brain when done to see if there are any new small cerebral infarcts involving the left hemisphere    Nevertheless, because of symptomatic high-grade stenosis, patient recommended left carotid intervention, was informed, if nothing is done, she is at high risk for major stroke, risk benefits of surgical intervention were explained briefly patient understands, somewhat anxious and worried, patient reassured, was told, initially a cardiology consultation will be obtained to make sure she does not have any occult underlying coronary artery disease and if found with stable cardiac perspective, consider left carotid endarterectomy    She was explained, the procedure will be done by Dr. Nirmala Velasco and associates who will see her tomorrow    All her questions were answered    Thank you for letting me participate in the care of your patient          Electronically signed by Keira Cm MD on 6/8/2021 at 7:04 PM

## 2021-06-08 NOTE — H&P
[facility]  History and Physical      CHIEF COMPLAINT: Left facial droop    Reason for Admission: Suspected stroke    History Obtained From: Patient    PCP :  Felipa Coffey MD    [id number]  [address] / [place] [address]      HISTORY OF PRESENT ILLNESS:      The patient is a 53 y.o. female presented initially to the emergency room on  for left-sided facial droop. She was recommended admission. She declined admission because she has to care for her mother. She made arrangements for that and return to the emergency room from where she was admitted for further evaluation and treatment. At the time of my questioning patient symptomatology has resolved. Does not have any other focal weakness or numbness. Only underlying history is active tobacco use. Past Medical History:    No past medical history on file. Past Surgical History:        Procedure Laterality Date    THROAT SURGERY      nodule removed         Medications Prior to Admission:    No medications prior to admission.     Allergies:  Pcn [penicillins]    Social History:   Social History     Socioeconomic History    Marital status: Single     Spouse name: Not on file    Number of children: Not on file    Years of education: Not on file    Highest education level: Not on file   Occupational History    Not on file   Tobacco Use    Smoking status: Current Every Day Smoker     Packs/day: 1.50     Types: Cigarettes    Smokeless tobacco: Never Used   Substance and Sexual Activity    Alcohol use: No    Drug use: Yes     Types: Marijuana    Sexual activity: Not on file   Other Topics Concern    Not on file   Social History Narrative    Not on file     Social Determinants of Health     Financial Resource Strain:     Difficulty of Paying Living Expenses:    Food Insecurity:     Worried About Running Out of Food in the Last Year:     [address] in the Last Year:    Transportation Needs:     Lack deficits      DATA:     Recent Results (from the past 24 hour(s))   EKG 12 Lead    Collection Time: 06/07/21 10:33 PM   Result Value Ref Range    Ventricular Rate 103 BPM    Atrial Rate 103 BPM    P-R Interval 158 ms    QRS Duration 72 ms    Q-T Interval 338 ms    QTc Calculation (Bazett) 442 ms    P Axis 65 degrees    R Axis 47 degrees    T Axis 45 degrees   CBC    Collection Time: 06/07/21 10:36 PM   Result Value Ref Range    WBC 12.5 (H) 4.5 - 11.5 E9/L    RBC 5.33 3.50 - 5.50 E12/L    Hemoglobin 16.4 (H) 11.5 - 15.5 g/dL    Hematocrit 48.2 (H) 34.0 - 48.0 %    MCV 90.4 80.0 - 99.9 fL    MCH 30.8 26.0 - 35.0 pg    MCHC 34.0 32.0 - 34.5 %    RDW 12.2 11.5 - 15.0 fL    Platelets 435 041 - 122 E9/L    MPV 11.1 7.0 - 12.0 fL   Comprehensive Metabolic Panel    Collection Time: 06/07/21 10:36 PM   Result Value Ref Range    Sodium 138 132 - 146 mmol/L    Potassium 3.5 3.5 - 5.0 mmol/L    Chloride 101 98 - 107 mmol/L    CO2 25 22 - 29 mmol/L    Anion Gap 12 7 - 16 mmol/L    Glucose 188 (H) 74 - 99 mg/dL    BUN 8 6 - 20 mg/dL    CREATININE 0.6 0.5 - 1.0 mg/dL    GFR Non-African American >60 >=60 mL/min/1.73    GFR African American >60     Calcium 9.7 8.6 - 10.2 mg/dL    Total Protein 7.8 6.4 - 8.3 g/dL    Albumin 4.3 3.5 - 5.2 g/dL    Total Bilirubin 0.3 0.0 - 1.2 mg/dL    Alkaline Phosphatase 81 35 - 104 U/L    ALT 17 0 - 32 U/L    AST 14 0 - 31 U/L   Troponin    Collection Time: 06/07/21 10:36 PM   Result Value Ref Range    Troponin, High Sensitivity <6 0 - 9 ng/L   APTT    Collection Time: 06/07/21 10:36 PM   Result Value Ref Range    aPTT 31.4 24.5 - 35.1 sec   Protime-INR    Collection Time: 06/07/21 10:36 PM   Result Value Ref Range    Protime 12.4 9.3 - 12.4 sec    INR 1.1    TYPE AND SCREEN    Collection Time: 06/07/21 10:36 PM   Result Value Ref Range    ABO/Rh A NEG     Antibody Screen NEG    HCG, SERUM, QUALITATIVE    Collection Time: 06/08/21  2:22 AM   Result Value Ref Range    hCG Qual NEGATIVE NEGATIVE

## 2021-06-08 NOTE — PLAN OF CARE
Problem: Falls - Risk of:  Goal: Will remain free from falls  Description: Will remain free from falls  Outcome: Ongoing     Problem: Falls - Risk of:  Goal: Absence of physical injury  Description: Absence of physical injury  Outcome: Ongoing     Problem: HEMODYNAMIC STATUS  Goal: Patient has stable vital signs and fluid balance  Outcome: Ongoing     Problem: ACTIVITY INTOLERANCE/IMPAIRED MOBILITY  Goal: Mobility/activity is maintained at optimum level for patient  Outcome: Ongoing

## 2021-06-09 ENCOUNTER — APPOINTMENT (OUTPATIENT)
Dept: MRI IMAGING | Age: 51
DRG: 024 | End: 2021-06-09
Payer: COMMERCIAL

## 2021-06-09 ENCOUNTER — APPOINTMENT (OUTPATIENT)
Dept: NUCLEAR MEDICINE | Age: 51
DRG: 024 | End: 2021-06-09
Payer: COMMERCIAL

## 2021-06-09 ENCOUNTER — ANESTHESIA EVENT (OUTPATIENT)
Dept: OPERATING ROOM | Age: 51
DRG: 024 | End: 2021-06-09
Payer: COMMERCIAL

## 2021-06-09 PROBLEM — F17.200 TOBACCO DEPENDENCE: Status: ACTIVE | Noted: 2021-06-09

## 2021-06-09 PROBLEM — I65.22 LEFT CAROTID STENOSIS: Status: ACTIVE | Noted: 2021-06-09

## 2021-06-09 PROBLEM — I63.419 CEREBRAL INFARCTION DUE TO EMBOLISM OF MIDDLE CEREBRAL ARTERY (HCC): Status: ACTIVE | Noted: 2021-06-09

## 2021-06-09 PROBLEM — I63.9 ACUTE ISCHEMIC STROKE (HCC): Status: ACTIVE | Noted: 2021-06-09

## 2021-06-09 LAB
CHOLESTEROL, TOTAL: 247 MG/DL (ref 0–199)
HBA1C MFR BLD: 5.3 % (ref 4–5.6)
HDLC SERPL-MCNC: 35 MG/DL
LDL CHOLESTEROL CALCULATED: 183 MG/DL (ref 0–99)
LV EF: 81 %
LVEF MODALITY: NORMAL
TRIGL SERPL-MCNC: 144 MG/DL (ref 0–149)
VLDLC SERPL CALC-MCNC: 29 MG/DL

## 2021-06-09 PROCEDURE — 83036 HEMOGLOBIN GLYCOSYLATED A1C: CPT

## 2021-06-09 PROCEDURE — 6370000000 HC RX 637 (ALT 250 FOR IP): Performed by: PSYCHIATRY & NEUROLOGY

## 2021-06-09 PROCEDURE — 97161 PT EVAL LOW COMPLEX 20 MIN: CPT

## 2021-06-09 PROCEDURE — 36415 COLL VENOUS BLD VENIPUNCTURE: CPT

## 2021-06-09 PROCEDURE — 93017 CV STRESS TEST TRACING ONLY: CPT

## 2021-06-09 PROCEDURE — 93016 CV STRESS TEST SUPVJ ONLY: CPT | Performed by: INTERNAL MEDICINE

## 2021-06-09 PROCEDURE — 78452 HT MUSCLE IMAGE SPECT MULT: CPT | Performed by: INTERNAL MEDICINE

## 2021-06-09 PROCEDURE — 99024 POSTOP FOLLOW-UP VISIT: CPT | Performed by: SURGERY

## 2021-06-09 PROCEDURE — A9500 TC99M SESTAMIBI: HCPCS | Performed by: RADIOLOGY

## 2021-06-09 PROCEDURE — 6370000000 HC RX 637 (ALT 250 FOR IP): Performed by: PHYSICIAN ASSISTANT

## 2021-06-09 PROCEDURE — 6360000002 HC RX W HCPCS: Performed by: INTERNAL MEDICINE

## 2021-06-09 PROCEDURE — 3430000000 HC RX DIAGNOSTIC RADIOPHARMACEUTICAL: Performed by: RADIOLOGY

## 2021-06-09 PROCEDURE — 80061 LIPID PANEL: CPT

## 2021-06-09 PROCEDURE — APPSS60 APP SPLIT SHARED TIME 46-60 MINUTES: Performed by: PHYSICIAN ASSISTANT

## 2021-06-09 PROCEDURE — 6370000000 HC RX 637 (ALT 250 FOR IP): Performed by: INTERNAL MEDICINE

## 2021-06-09 PROCEDURE — 93018 CV STRESS TEST I&R ONLY: CPT | Performed by: INTERNAL MEDICINE

## 2021-06-09 PROCEDURE — 97165 OT EVAL LOW COMPLEX 30 MIN: CPT

## 2021-06-09 PROCEDURE — 1200000000 HC SEMI PRIVATE

## 2021-06-09 PROCEDURE — 78452 HT MUSCLE IMAGE SPECT MULT: CPT

## 2021-06-09 PROCEDURE — 70551 MRI BRAIN STEM W/O DYE: CPT

## 2021-06-09 PROCEDURE — 99254 IP/OBS CNSLTJ NEW/EST MOD 60: CPT | Performed by: INTERNAL MEDICINE

## 2021-06-09 RX ADMIN — REGADENOSON 0.4 MG: 0.08 INJECTION, SOLUTION INTRAVENOUS at 12:10

## 2021-06-09 RX ADMIN — Medication 10 MILLICURIE: at 10:56

## 2021-06-09 RX ADMIN — CLOPIDOGREL 75 MG: 75 TABLET, FILM COATED ORAL at 15:07

## 2021-06-09 RX ADMIN — METOPROLOL TARTRATE 12.5 MG: 25 TABLET, FILM COATED ORAL at 18:15

## 2021-06-09 RX ADMIN — ASPIRIN 81 MG CHEWABLE TABLET 81 MG: 81 TABLET CHEWABLE at 15:07

## 2021-06-09 RX ADMIN — ATORVASTATIN CALCIUM 40 MG: 40 TABLET, FILM COATED ORAL at 20:20

## 2021-06-09 RX ADMIN — Medication 35 MILLICURIE: at 13:28

## 2021-06-09 ASSESSMENT — PAIN SCALES - WONG BAKER: WONGBAKER_NUMERICALRESPONSE: 0

## 2021-06-09 ASSESSMENT — PAIN SCALES - GENERAL
PAINLEVEL_OUTOF10: 0

## 2021-06-09 NOTE — CONSULTS
Inpatient Cardiology Consultation      Reason for Consult: Cardiac evaluation: Prior to anticipated surgery for symptomatic left carotid stenosis: [id number] [facility]    Consulting Physician: Disha Bernardo MD    Requesting Physician:  Leoncio Ortez MD    Date of Consultation: 6/9/2021    HISTORY OF PRESENT ILLNESS OF Barbara Yeh located in  room 8514/8514-B:     Hill Pavon is a 53 y.o. female  unknown to [id number] [facility]     According to patient she has no history of any cardiovascular problems until now. She has history of smoking about 25 years about 1 pack a day, uses marijuana, overweight. She presented to ED of [facility] on 6/6/2021 with left sided facial droop which spontaneously resolved (per ED EMR). CTa  of the head from 6/6/2021 showed: Severe focal stenosis at origin of left internal carotid artery of 90% or greater. There is plans for carotid enterectomy and cardiology was consulted for preop evaluation. During the exam: patient was resting comfortable without any signs of distress; was cooperative; presenting no complaints;  denied chest pain, shortness of breath at rest, shortness of breath at minimal effort, palpitations , lightheadedness, dizziness, cough, chills, fever, abdominal pains , nausea  and general tiredness. According to patient she developed an episode of garbled speech which resolved. She denies any history of chest pain, shortness of breath, palpitations, lightheadedness. She does yard work without any problems. 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 She purchase is treadmill but did not yet have time to use it. She stopped smoking since this admission. She does not know if she has an elevated cholesterol.     Please note: past medical records were reviewed per electronic medical record (EMR) - see detailed reports under Past Medical/ Surgical History. Past Medical History:    No past medical history on file. Past Surgical History:    Past Surgical History:   Procedure Laterality Date    THROAT SURGERY      nodule removed       Medications Prior to admit:  Prior to Admission medications    Not on File       Current Medications:    Current Facility-Administered Medications: aspirin chewable tablet 81 mg, 81 mg, Oral, Daily  atorvastatin (LIPITOR) tablet 40 mg, 40 mg, Oral, Nightly  clopidogrel (PLAVIX) tablet 75 mg, 75 mg, Oral, Daily    Allergies:  Pcn [penicillins]    Social History: Smoking cigarettes about 25 years about a pack a day. Uses marijuana almost daily. Denies use of alcohol. Social History     Socioeconomic History    Marital status: Single     Spouse name: Not on file    Number of children: Not on file    Years of education: Not on file    Highest education level: Not on file   Occupational History    Not on file   Tobacco Use    Smoking status: Current Every Day Smoker     Packs/day: 1.50     Types: Cigarettes    Smokeless tobacco: Never Used   Substance and Sexual Activity    Alcohol use: No    Drug use: Yes     Types: Marijuana    Sexual activity: Not on file   Other Topics Concern    Not on file   Social History Narrative    Not on file     Social Determinants of Health     Financial Resource Strain:     Difficulty of Paying Living Expenses:    Food Insecurity:     Worried About Running Out of Food in the Last Year:     Ran Out of Food in the Last Year:    Transportation Needs:     Lack of Transportation (Medical):      Lack of Transportation (Non-Medical):    Physical Activity:     Days of Exercise per Week:     Minutes of Exercise per Session:    Stress:     Feeling of Stress :    Social Connections:     Frequency of Communication with Friends and Family:     Frequency of Social Gatherings with Friends and Family:     Attends Yazdanism Services:     Active Member of Clubs or Organizations: moist  Neck-  No stridor, trachea midline, no jugular venous distention. No adenopathy   CHEST: Chest symmetrical and non-tender to palpation. No accessory muscle use or intercostal retractions  RESPIRATORY:Lung sounds - clear throughout fields;  CARDIOVASCULAR:     No carotid bruits  Heart Inspection- shows no noted pulsations  Heart Palpation- no heaves or thrills; PMI is non-displaced   Heart Ausculation- Regular rate and rhythm, no murmur. No s3, s4 or rub   PV: No lower extremity edema. No varicosities. DP pulses: +1 left, +2 right; radial pulses: +2 bilateral, no clubbing or cyanosis   ABDOMEN: Soft, non-tender to light palpation. Bowel sounds present. MS: Moves all extremities Good muscle strength and tone. No atrophy or abnormal movements. : Deferred  SKIN: Warm and dry,  no stasis dermatitis or ulcers on legs  NEURO / PSYCH: Oriented to person, place and time. Speech clear and appropriate. Follows all commands. Pleasant affect     DATA:    ECG reviewed with Dr. Andrew Morales strips: Sinus rhythm singular PAC  Diagnostic:        CT HEAD WO CONTRAST    Result Date: 6/8/2021  EXAMINATION: CT OF THE HEAD WITHOUT CONTRAST  6/8/2021 3:27 am TECHNIQUE: CT of the head was performed without the administration of intravenous contrast. Dose modulation, iterative reconstruction, and/or weight based adjustment of the mA/kV was utilized to reduce the radiation dose to as low as reasonably achievable. COMPARISON: June 6, 2021 HISTORY: ORDERING SYSTEM PROVIDED HISTORY: CVA TECHNOLOGIST PROVIDED HISTORY: Has a \"code stroke\" or \"stroke alert\" been called? ->No Reason for exam:->CVA Decision Support Exception - unselect if not a suspected or confirmed emergency medical condition->Emergency Medical Condition (MA) What reading provider will be dictating this exam?->CRC FINDINGS: BRAIN/VENTRICLES: Several lacunar infarctions involving the left basal ganglia are grossly stable in appearance compared to the prior TECHNOLOGIST PROVIDED HISTORY: Reason for exam:->dysphagia-wAIVE BUN/CREAT CAN DO NOW Has a \"code stroke\" or \"stroke alert\" been called? ->No Decision Support Exception - unselect if not a suspected or confirmed emergency medical condition->Emergency Medical Condition (MA) FINDINGS: Unenhanced CT head: No acute intracranial hemorrhage. No hydrocephalus. Subcentimeter focus of hypoattenuation associated with head of the caudate nucleus on the left. Additional subcentimeter focus of hypoattenuation associated with the left basal ganglia. CTA head: Anterior cerebral arteries, middle cerebral arteries, and posterior cerebral arteries are patent without evidence of stenosis. Normal vertebrobasilar junction. No evidence of basilar artery stenosis. No intracranial aneurysm or AVM. CTA neck: Severe stenosis of proximal left ICA due to noncalcified atherosclerotic plaque resulting in 90% or greater stenosis. No stenosis on the right. Both vertebral arteries are patent without evidence of stenosis or dissection. 1.  No acute intracranial hemorrhage. 2.  Lacunar areas of stroke involving head of the caudate nucleus on the left and left basal ganglia of uncertain chronicity. MRI brain may be helpful for further evaluation. 3.  Severe focal stenosis at origin of left internal carotid artery of 90% or greater. CTA HEAD W CONTRAST    Result Date: 6/6/2021  EXAMINATION: CTA OF THE HEAD WITH CONTRAST; CTA OF THE NECK 6/6/2021 7:59 pm: TECHNIQUE: CTA of the head/brain was performed with the administration of intravenous contrast. Multiplanar reformatted images are provided for review. MIP images are provided for review. Dose modulation, iterative reconstruction, and/or weight based adjustment of the mA/kV was utilized to reduce the radiation dose to as low as reasonably achievable.; CTA of the neck was performed with the administration of intravenous contrast. Multiplanar reformatted images are provided for review. MIP images are provided for review. Stenosis of the internal carotid arteries measured using NASCET criteria. Dose modulation, iterative reconstruction, and/or weight based adjustment of the mA/kV was utilized to reduce the radiation dose to as low as reasonably achievable. COMPARISON: None. HISTORY: ORDERING SYSTEM PROVIDED HISTORY: dysphagia-wAIVE BUN/CREAT CAN DO NOW TECHNOLOGIST PROVIDED HISTORY: Reason for exam:->dysphagia-wAIVE BUN/CREAT CAN DO NOW Has a \"code stroke\" or \"stroke alert\" been called? ->No Decision Support Exception - unselect if not a suspected or confirmed emergency medical condition->Emergency Medical Condition (MA) FINDINGS: Unenhanced CT head: No acute intracranial hemorrhage. No hydrocephalus. Subcentimeter focus of hypoattenuation associated with head of the caudate nucleus on the left. Additional subcentimeter focus of hypoattenuation associated with the left basal ganglia. CTA head: Anterior cerebral arteries, middle cerebral arteries, and posterior cerebral arteries are patent without evidence of stenosis. Normal vertebrobasilar junction. No evidence of basilar artery stenosis. No intracranial aneurysm or AVM. CTA neck: Severe stenosis of proximal left ICA due to noncalcified atherosclerotic plaque resulting in 90% or greater stenosis. No stenosis on the right. Both vertebral arteries are patent without evidence of stenosis or dissection. 1.  No acute intracranial hemorrhage. 2.  Lacunar areas of stroke involving head of the caudate nucleus on the left and left basal ganglia of uncertain chronicity. MRI brain may be helpful for further evaluation. 3.  Severe focal stenosis at origin of left internal carotid artery of 90% or greater.        Labs:   CBC:   Recent Labs     06/06/21 1945 06/07/21 2236   WBC 12.1* 12.5*   HGB 16.7* 16.4*   HCT 48.1* 48.2*    238     BMP:   Recent Labs     06/06/21 1945 06/07/21 2236    138   K 3.8 3.5   CO2 23 25   BUN 11 8 CREATININE 0.6 0.6   LABGLOM >60 >60   CALCIUM 9.3 9.7     Mag:   Recent Labs     06/06/21 1945   MG 1.9       PT/INR:   Recent Labs     06/06/21 1945 06/07/21 2236   PROTIME >120.0* 12.4   INR >10.0* 1.1     APTT:  Recent Labs     06/07/21 2236   APTT 31.4     CARDIAC ENZYMES:  Recent Labs     06/06/21 1945   CKTOTAL 70   CKMB 1.0     FASTING LIPID PANEL:No results found for: CHOL, HDL, LDLDIRECT, LDLCALC, TRIG  LIVER PROFILE:  Recent Labs     06/06/21 1945 06/07/21 2236   AST 14 14   ALT 16 17   LABALBU 4.3 4.3             ASSESSMENT:  · HTN  · Severe focal stenosis at origin of left internal carotid artery of 90% or greater. · Lacunar areas of stroke involving head of the caudate nucleus on the left and left basal ganglia of   uncertain chronicity    · Smoking  · Obesity      PLAN:    Continue Telemetry  STRESS TEST  Continue statins and Plavix  Pre op BB  Lipid panel, hemoglobin A1c  Further recommendation upon the results of stress test      Assessment and Plan to follow as per Dr. Yared Ram MD    Electronically signed by ISABELLE Christensen on 6/9/2021 at [address] Cardiology Consult       Casey Garcia    I have personally participated in a face-to-face and personally obtained history and performed physical exam on the date of service. I reviewed chart, vitals, labs and radiologic studies. I also participated in medical decision making with ISABELLE Christensen on the date of service All of the assessments and recommendations are from me and I agree with all of the pertinent clinical information, assessment and treatment plan. I have reviewed and edited the note above based on my findings during my history, exam, and decision making. Please see my additional contributions to the history, physical exam, assessment, and recommendations below. HISTORY OF PRESENT ILLNESS:     Reviewed, as above      Past medical history:  Reviewed, as above.     Past surgical history:  Reviewed, as above.    Current medications:  Reviewed, as above    Allergies:  Reviewed, as above    Social history:  Reviewed, as above    Family medical history:  Reviewed, as above. REVIEW OF SYSTEMS:   Reviewed, as above. PHYSICAL EXAM:   CONSTITUTIONAL:  awake, alert, cooperative, no apparent distress, and appears stated age  EYES:  lids and lashes normal and pupils equal, round and reactive to light, anicteric sclerae  HEAD:  normocepalic, without obvious abnormality, atraumatic, pink, moist mucous membranes. NECK:  Supple, symmetrical, trachea midline, no adenopathy, thyroid symmetric, not enlarged and no tenderness, skin normal  HEMATOLOGIC/LYMPHATICS:  no cervical lymphadenopathy and no supraclavicular lymphadenopathy  LUNGS:  No increased work of breathing, good air exchange, clear to auscultation bilaterally, no crackles or wheezing  CARDIOVASCULAR:  Normal apical impulse, regular rate and rhythm, normal S1 and S2, no S3 or S4, and no murmur noted and no JVD, + carotid bruit, no pedal edema, good carotid upstroke bilaterally. ABDOMEN:  Soft, nontender, no masses, no hepatomegaly or splenomegaly, BS+  CHEST: nontender to palpation, expands symmetrically  MUSCULOSKELETAL:  No clubbing no cyanosis. there is no redness, warmth, or swelling of the joints  full range of motion noted  NEUROLOGIC:  Alert, awake,oriented x3. SKIN:  no bruising or bleeding, normal skin color, texture, turgor and no redness, warmth, or swelling    BP (!) 160/85   Pulse 81   Temp 97.1 °F (36.2 °C) (Temporal)   Resp 18   Ht 4' 11\" (1.499 m)   Wt 162 lb (73.5 kg)   SpO2 99%   Breastfeeding No   BMI 32.72 kg/m²       No intake/output data recorded. No intake/output data recorded.       DATA:   I personally reviewed the admission EKG with the following interpretation: Sinus tachycardia    ECHO: Not performed to date  Stress Test: Not performed to date  Angiography: Not performed to date  Cardiology Labs:   BMP:    Lab Results Component Value Date     06/07/2021    K 3.5 06/07/2021     06/07/2021    CO2 25 06/07/2021    BUN 8 06/07/2021     CMP:    Lab Results   Component Value Date     06/07/2021    K 3.5 06/07/2021     06/07/2021    CO2 25 06/07/2021    BUN 8 06/07/2021    PROT 7.8 06/07/2021     CBC:    Lab Results   Component Value Date    WBC 12.5 06/07/2021    RBC 5.33 06/07/2021    HGB 16.4 06/07/2021    HCT 48.2 06/07/2021    MCV 90.4 06/07/2021    RDW 12.2 06/07/2021     06/07/2021     PT/INR:  No results found for: PTINR  PT/INR Warfarin:  No components found for: PTPATWAR, PTINRWAR  PTT:    Lab Results   Component Value Date    APTT 31.4 06/07/2021     PTT Heparin:  No components found for: APTTHEP  Magnesium:    Lab Results   Component Value Date    MG 1.9 06/06/2021     TSH:  No results found for: TSH  TROPONIN:  No components found for: TROP  BNP:  No results found for: BNP  FASTING LIPID PANEL:    Lab Results   Component Value Date    CHOL 247 06/09/2021    HDL 35 06/09/2021    TRIG 144 06/09/2021         I have personally reviewed the laboratory, cardiac diagnostic and radiographic testing as outlined above:    IMPRESSION:  1. CVA: Secondary to carotid artery stenosis  2. Hypertension: Controlled  3. Peripheral vascular disease  4. Tobacco abuse: Patient was counseled regarding smoking cessation  5. Preoperative cardiac evaluation prior to left CEA      RECOMMENDATIONS:   1. Patient has several risk factors for CAD, will schedule for Lexiscan stress test for further risk stratification's prior to vascular surgery  2. Continue current treatment  3.  Patient metabolic panel and CBC in a.m.  4. Further cardiac recommendations will be forthcoming pending her clinical course and diagnostic test findings    I have reviewed my findings and recommendations with patient    Thank you for the consult  Electronically signed by Lakshmi Joseph MD on 6/9/2021 at 7:26 PM  NOTE: This report was transcribed using voice recognition software.  Every effort was made to ensure accuracy; however, inadvertent computerized transcription errors may be present

## 2021-06-09 NOTE — PROGRESS NOTES
Spoke with pt at length in regards to plan of care - pt expressed concerns and anxiety toward possible surgery encouragement and support given

## 2021-06-09 NOTE — PROGRESS NOTES
arrival, agreeable to PT evaluation. Pt reported mild lightheadedness and dizziness at rest, with symptoms resolving with laying down. Pt competed supine to sit transfer and sat EOB. Pt completed sit to stand transfer and ambulated around unit independently. Pt presented with steady gait speed and balance during ambulation. Pt returned to room and sat EOB. Pt was left seated in bed with all questions and concerns addressed at end of session. Treatment:  Patient practiced and was instructed in the following treatment:     Therapeutic Activities:  o Bed Mobility: Pt completed supine to sit transfer, challenging strength and dynamic sitting balance. Pt completed scooting to EOB, challenging dynamic sitting balance. o Transfers: Pt completed sit to stand transfer, challenging strength and tolerance to activity/upright. Pt negotiated stairs with reciprocal stepping pattern, challenging strength, endurance, dynamic standing balance, and tolerance to activity/upright.   o Ambulation: Pt ambulated independently, challenging endurance, dynamic standing balance, and tolerance to activity. PHYSICAL THERAPY PLAN OF CARE:    Based on pt's performance and current level of function, it is not believed that this pt requires skilled PT interventions. Please re-consult with PT with any changes or declines in function. Thank you.     Referring provider/PT Order:    Start   Ordering Provider    06/09/21 1115  PT eval and treat Start: 06/09/21 1115, End: 06/09/21 1115, ONE TIME, Standing Count: 1 Occurrences, R      TODD Pierce - CNP      Diagnosis:  Acute cerebrovascular accident (CVA) ([facility] [address] [I63.9]  Acute cerebrovascular accident (CVA) ([facility] [address] [I63.9]  Acute ischemic stroke [facility]) [I63.9]    Evaluation Time includes thorough review of current medical information, gathering information on past medical history/social history and prior level of function, completion of standardized testing/informal observation of tasks, assessment of data and education on plan of care and goals.     CPT codes:  [x] Low Complexity PT evaluation 03296  [] Moderate Complexity PT evaluation 53379  [] High Complexity PT evaluation 67323  [] PT Re-evaluation 08504  [] Gait training 38898 0 minutes  [] Manual therapy 50919 0 minutes  [] Therapeutic activities 11690 0 minutes  [] Therapeutic exercises 48966 0 minute  [] Neuromuscular reeducation 19010 0 minutes     Yvonne Madrid, PT, DPT  [id number]    Fei Diaz, SPT

## 2021-06-09 NOTE — PROCEDURES
[facility] Nuclear Stress Test:    Cardiologist: Dr. Tracy Ortiz MD    Date: 6/9/2021    Indications for study: Chest pain    1. No chest pain  2. No new arrhythmias  3. No EKG changes suggestive of stress induced ischemia  4.  Nuclear images pending    Olman Méndez MD  [facility]) Cardiology

## 2021-06-09 NOTE — PLAN OF CARE
Plan of care    Went to assess patient and not in room at this time. Will try back later if time permits.

## 2021-06-09 NOTE — CARE COORDINATION
Attempted to meet the patient at the bedside to discuss transition of care planning. Patient is currently off the floor in the stress lab for a test.  Will follow with the patient as she is available.      Dominguez Samuel RN.  [facility]  [phone]

## 2021-06-09 NOTE — PLAN OF CARE
Angelita Spear is a 53 y.o. right handed female     Neurology following for stroke    PMH of HTN, HLD    Presented to ED with episode of speech disturbance, expressive aphasia. She no other motor or sensory symptoms. CTH showed left lacunar infarct of unclear chronicity. CTA head/neck showed LICA 31% stenosed. Vascular was consulted and patient to consider left carotid intervention. MRI brain showed several small infarcts to left hemisphere likely from LICA stenosis. Patient is currently undergoing left CEA    Laboratory/Radiology:     CBC with Differential:    Lab Results   Component Value Date    WBC 12.5 06/07/2021    RBC 5.33 06/07/2021    HGB 16.4 06/07/2021    HCT 48.2 06/07/2021     06/07/2021    MCV 90.4 06/07/2021    MCH 30.8 06/07/2021    MCHC 34.0 06/07/2021    RDW 12.2 06/07/2021     CMP:    Lab Results   Component Value Date     06/07/2021    K 3.5 06/07/2021     06/07/2021    CO2 25 06/07/2021    BUN 8 06/07/2021    CREATININE 0.6 06/07/2021    GFRAA >60 06/07/2021    LABGLOM >60 06/07/2021    GLUCOSE 188 06/07/2021    PROT 7.8 06/07/2021    LABALBU 4.3 06/07/2021    CALCIUM 9.7 06/07/2021    BILITOT 0.3 06/07/2021    ALKPHOS 81 06/07/2021    AST 14 06/07/2021    ALT 17 06/07/2021     Hepatic Function Panel:    Lab Results   Component Value Date    ALKPHOS 81 06/07/2021    ALT 17 06/07/2021    AST 14 06/07/2021    PROT 7.8 06/07/2021    BILITOT 0.3 06/07/2021    BILIDIR 0.2 06/06/2021    IBILI 0.0 06/06/2021    LABALBU 4.3 06/07/2021     HgBA1c:  No results found for: LABA1C  FLP:    Lab Results   Component Value Date    TRIG 144 06/09/2021    HDL 35 06/09/2021    LDLCALC 183 06/09/2021    LABVLDL 29 06/09/2021     CTH showed left lacunar infarct of unclear chronicity. CTA head/neck showed LICA 87% stenosed. MRI brain showed several small infarcts to left hemisphere likely from LICA stenosis.     All labs and imaging studies reviewed independently today    Assessment:     Left lacunar infarct with several small infarcts in left hemisphere likely due to stenosed LICA  --- Left CEA by vascular  ---  and A1c pending  --- stroke risk factors include: HTN, HLD    Plan:     Continue DAPT  Continue statin  PT/OT  PCD's  Stroke eduction  Stroke clinic follow up   Neurology to follow       Pedro Pablo Shown, APRN - CNP, APRN, CNP  11:26 AM  6/9/2021

## 2021-06-09 NOTE — PROGRESS NOTES
good     Functional Assessment:  AM-PAC Daily Activity Raw Score: 23/24   Initial Eval Status  Date: 6/9/21   Feeding IND (pt able to open packages and self feed)    Grooming IDN (standing at sink)    UB Dressing IND    LB Dressing IND (pt able to use crossing of leg technique to doff/dominik B socks)   Bathing SUP (simulated)   Toileting IND (pt able to complete mikki hygiene and pants management)   Bed Mobility  Log roll: IND  Supine to sit: IND   Sit to supine: IND    Functional Transfers Sit to stand:IND   Stand to sit:IND  Commode: IND   Functional Mobility IND (using no AD, to/from bathroom)   Balance Sitting: IND  Standing: IND   Activity Tolerance good   Visual/  Perceptual Glasses: yes     pt able to read clock on wall    visual tracking appears WFL          Hand dominance: R  UE ROM: RUE:  WFL  LUE:  WFL  Strength: RUE: grossly 4-/5 LUE: grossly 4-/5   Strength: B WFL  Fine Motor Coordination:  WFL, finger to nose assessment appears WFL    Hearing: WFL  Sensation:  No c/o numbness/tingling, light touch assessment appears WFL  Tone:  WFL  Edema: none noted                            Comments:Cleared by RN to see pt. Upon arrival, patient supine in bed and agreeable to OT session. Pt completes ADLs/functional mobility/transfers Mod I, demo'ing good balance/safety awareness. At end of session, patient supine in bed with call light and phone within reach, all lines and tubes intact. Pt demonstrates no OT needs at this time. Eval Complexity: Low    Patient instructed on diagnosis, prognosis/goals and plan of care. Demonstrated good understanding. [] Malnutrition indicators have been identified and nursing has been notified to ensure a dietitian consult is ordered.      Evaluation time includes thorough review of current medical information, gathering information on past medical & social history & PLOF, completion of standardized testing, informal observation of tasks, consultation with other medical professions/disciplines, assessment of data & development of POC/goals.      Time In: 2:55       Time Out: 3:05     Total treatment time: 0       Treatment Charges: Mins Units   OT Eval Low 19682 X    OT Eval Medium 62485     OT Eval High 11493     OT Re-Eval [id number]     Ther Ex  07672       Manual Therapy 94533       Thera Activities 68134       ADL/Home Mgt 28430     Neuro Re-ed 44525       Group Therapy        Orthotic manage/training  47085       Non-Billable Time           Erroll Spray, OTR/L [id number]

## 2021-06-09 NOTE — PLAN OF CARE
Plan of care    Multiple attempts to see patient as she is in testing will try back if time permits if not will see her tomorrow.

## 2021-06-09 NOTE — PROGRESS NOTES
in the left basal ganglia measuring approximately 9 mm. 2. No hemorrhage, mass effect or midline shift. CT HEAD WO CONTRAST   Final Result   Grossly stable lacunar infarctions involving the left basal ganglia. Correlation with brain MRI could be considered to determine chronicity. NM Cardiac Stress Test Nuclear Imaging    (Results Pending)       ASSESSMENT/PLAN:   · #1 critical left carotid artery stenosis and infarct. At this point she is completely recovered I reviewed the CT scan I reviewed the MRI. Also talked with my partner. We will plan on left carotid endarterectomy. I discussed risk benefits and alternatives with the patient cleared not limited to bleeding, infection, arteriovenous nerve injury, myocardial infarction, respiratory failure, anesthetic reaction, pain swelling or tenderness, sensorimotor disturbance stroke, recurrence, swallowing difficulty and/or death. She understands and wishes to proceed.         Electronically signed by Josesito Pablo MD on 6/9/2021 at 1:33 PM

## 2021-06-10 ENCOUNTER — ANESTHESIA (OUTPATIENT)
Dept: OPERATING ROOM | Age: 51
DRG: 024 | End: 2021-06-10
Payer: COMMERCIAL

## 2021-06-10 VITALS — SYSTOLIC BLOOD PRESSURE: 174 MMHG | DIASTOLIC BLOOD PRESSURE: 144 MMHG | OXYGEN SATURATION: 94 % | TEMPERATURE: 97 F

## 2021-06-10 PROCEDURE — 2500000003 HC RX 250 WO HCPCS

## 2021-06-10 PROCEDURE — 35301 RECHANNELING OF ARTERY: CPT | Performed by: SURGERY

## 2021-06-10 PROCEDURE — 2700000000 HC OXYGEN THERAPY PER DAY

## 2021-06-10 PROCEDURE — 03UL0KZ SUPPLEMENT LEFT INTERNAL CAROTID ARTERY WITH NONAUTOLOGOUS TISSUE SUBSTITUTE, OPEN APPROACH: ICD-10-PCS | Performed by: SURGERY

## 2021-06-10 PROCEDURE — 2580000003 HC RX 258: Performed by: SURGERY

## 2021-06-10 PROCEDURE — 7100000000 HC PACU RECOVERY - FIRST 15 MIN: Performed by: SURGERY

## 2021-06-10 PROCEDURE — 2500000003 HC RX 250 WO HCPCS: Performed by: SURGERY

## 2021-06-10 PROCEDURE — 3600000005 HC SURGERY LEVEL 5 BASE: Performed by: SURGERY

## 2021-06-10 PROCEDURE — 6360000002 HC RX W HCPCS: Performed by: SURGERY

## 2021-06-10 PROCEDURE — 6370000000 HC RX 637 (ALT 250 FOR IP): Performed by: SURGERY

## 2021-06-10 PROCEDURE — C1768 GRAFT, VASCULAR: HCPCS | Performed by: SURGERY

## 2021-06-10 PROCEDURE — C9248 INJ, CLEVIDIPINE BUTYRATE: HCPCS

## 2021-06-10 PROCEDURE — 2580000003 HC RX 258

## 2021-06-10 PROCEDURE — 3700000001 HC ADD 15 MINUTES (ANESTHESIA): Performed by: SURGERY

## 2021-06-10 PROCEDURE — 7100000001 HC PACU RECOVERY - ADDTL 15 MIN: Performed by: SURGERY

## 2021-06-10 PROCEDURE — 6360000002 HC RX W HCPCS: Performed by: NURSE PRACTITIONER

## 2021-06-10 PROCEDURE — 3600000015 HC SURGERY LEVEL 5 ADDTL 15MIN: Performed by: SURGERY

## 2021-06-10 PROCEDURE — 2709999900 HC NON-CHARGEABLE SUPPLY: Performed by: SURGERY

## 2021-06-10 PROCEDURE — 37799 UNLISTED PX VASCULAR SURGERY: CPT

## 2021-06-10 PROCEDURE — 2000000000 HC ICU R&B

## 2021-06-10 PROCEDURE — 03CL0ZZ EXTIRPATION OF MATTER FROM LEFT INTERNAL CAROTID ARTERY, OPEN APPROACH: ICD-10-PCS | Performed by: SURGERY

## 2021-06-10 PROCEDURE — 6360000002 HC RX W HCPCS

## 2021-06-10 PROCEDURE — 88304 TISSUE EXAM BY PATHOLOGIST: CPT

## 2021-06-10 PROCEDURE — 3700000000 HC ANESTHESIA ATTENDED CARE: Performed by: SURGERY

## 2021-06-10 PROCEDURE — 85347 COAGULATION TIME ACTIVATED: CPT

## 2021-06-10 DEVICE — XENOSURE BIOLOGIC PATCH, 0.8CM X 8CM, EIFU
Type: IMPLANTABLE DEVICE | Site: CAROTID | Status: FUNCTIONAL
Brand: XENOSURE BIOLOGIC PATCH

## 2021-06-10 RX ORDER — GLYCOPYRROLATE 1 MG/5 ML
SYRINGE (ML) INTRAVENOUS PRN
Status: DISCONTINUED | OUTPATIENT
Start: 2021-06-10 | End: 2021-06-10 | Stop reason: SDUPTHER

## 2021-06-10 RX ORDER — SODIUM CHLORIDE 0.9 % (FLUSH) 0.9 %
5-40 SYRINGE (ML) INJECTION PRN
Status: DISCONTINUED | OUTPATIENT
Start: 2021-06-10 | End: 2021-06-11 | Stop reason: HOSPADM

## 2021-06-10 RX ORDER — DEXTROSE, SODIUM CHLORIDE, AND POTASSIUM CHLORIDE 5; .45; .15 G/100ML; G/100ML; G/100ML
INJECTION INTRAVENOUS CONTINUOUS
Status: DISCONTINUED | OUTPATIENT
Start: 2021-06-10 | End: 2021-06-11

## 2021-06-10 RX ORDER — MIDAZOLAM HYDROCHLORIDE 1 MG/ML
INJECTION INTRAMUSCULAR; INTRAVENOUS PRN
Status: DISCONTINUED | OUTPATIENT
Start: 2021-06-10 | End: 2021-06-10 | Stop reason: SDUPTHER

## 2021-06-10 RX ORDER — OXYCODONE HYDROCHLORIDE 10 MG/1
10 TABLET ORAL EVERY 4 HOURS PRN
Status: DISCONTINUED | OUTPATIENT
Start: 2021-06-10 | End: 2021-06-11 | Stop reason: HOSPADM

## 2021-06-10 RX ORDER — LIDOCAINE HYDROCHLORIDE 20 MG/ML
INJECTION, SOLUTION INTRAVENOUS PRN
Status: DISCONTINUED | OUTPATIENT
Start: 2021-06-10 | End: 2021-06-10 | Stop reason: SDUPTHER

## 2021-06-10 RX ORDER — ONDANSETRON 4 MG/1
4 TABLET, ORALLY DISINTEGRATING ORAL EVERY 8 HOURS PRN
Status: DISCONTINUED | OUTPATIENT
Start: 2021-06-10 | End: 2021-06-11 | Stop reason: HOSPADM

## 2021-06-10 RX ORDER — NEOSTIGMINE METHYLSULFATE 1 MG/ML
INJECTION, SOLUTION INTRAVENOUS PRN
Status: DISCONTINUED | OUTPATIENT
Start: 2021-06-10 | End: 2021-06-10 | Stop reason: SDUPTHER

## 2021-06-10 RX ORDER — SODIUM CHLORIDE 9 MG/ML
25 INJECTION, SOLUTION INTRAVENOUS PRN
Status: DISCONTINUED | OUTPATIENT
Start: 2021-06-10 | End: 2021-06-11 | Stop reason: HOSPADM

## 2021-06-10 RX ORDER — CEFAZOLIN SODIUM 1 G/3ML
INJECTION, POWDER, FOR SOLUTION INTRAMUSCULAR; INTRAVENOUS PRN
Status: DISCONTINUED | OUTPATIENT
Start: 2021-06-10 | End: 2021-06-10 | Stop reason: SDUPTHER

## 2021-06-10 RX ORDER — MORPHINE SULFATE 2 MG/ML
2 INJECTION, SOLUTION INTRAMUSCULAR; INTRAVENOUS
Status: DISCONTINUED | OUTPATIENT
Start: 2021-06-10 | End: 2021-06-11

## 2021-06-10 RX ORDER — SODIUM CHLORIDE 0.9 % (FLUSH) 0.9 %
5-40 SYRINGE (ML) INJECTION EVERY 12 HOURS SCHEDULED
Status: DISCONTINUED | OUTPATIENT
Start: 2021-06-10 | End: 2021-06-11 | Stop reason: HOSPADM

## 2021-06-10 RX ORDER — OXYCODONE HYDROCHLORIDE 5 MG/1
5 TABLET ORAL EVERY 4 HOURS PRN
Status: DISCONTINUED | OUTPATIENT
Start: 2021-06-10 | End: 2021-06-11 | Stop reason: HOSPADM

## 2021-06-10 RX ORDER — PROTAMINE SULFATE 10 MG/ML
INJECTION, SOLUTION INTRAVENOUS PRN
Status: DISCONTINUED | OUTPATIENT
Start: 2021-06-10 | End: 2021-06-10 | Stop reason: SDUPTHER

## 2021-06-10 RX ORDER — PROMETHAZINE HYDROCHLORIDE 25 MG/ML
25 INJECTION, SOLUTION INTRAMUSCULAR; INTRAVENOUS PRN
Status: DISCONTINUED | OUTPATIENT
Start: 2021-06-10 | End: 2021-06-10 | Stop reason: HOSPADM

## 2021-06-10 RX ORDER — MEPERIDINE HYDROCHLORIDE 25 MG/ML
12.5 INJECTION INTRAMUSCULAR; INTRAVENOUS; SUBCUTANEOUS EVERY 5 MIN PRN
Status: DISCONTINUED | OUTPATIENT
Start: 2021-06-10 | End: 2021-06-10 | Stop reason: HOSPADM

## 2021-06-10 RX ORDER — SODIUM CHLORIDE 9 MG/ML
INJECTION, SOLUTION INTRAVENOUS CONTINUOUS PRN
Status: DISCONTINUED | OUTPATIENT
Start: 2021-06-10 | End: 2021-06-10 | Stop reason: SDUPTHER

## 2021-06-10 RX ORDER — PHENYLEPHRINE HCL IN 0.9% NACL 1 MG/10 ML
SYRINGE (ML) INTRAVENOUS PRN
Status: DISCONTINUED | OUTPATIENT
Start: 2021-06-10 | End: 2021-06-10 | Stop reason: SDUPTHER

## 2021-06-10 RX ORDER — CALCIUM CHLORIDE 100 MG/ML
INJECTION INTRAVENOUS; INTRAVENTRICULAR PRN
Status: DISCONTINUED | OUTPATIENT
Start: 2021-06-10 | End: 2021-06-10 | Stop reason: SDUPTHER

## 2021-06-10 RX ORDER — ACETAMINOPHEN 325 MG/1
650 TABLET ORAL EVERY 6 HOURS
Status: DISCONTINUED | OUTPATIENT
Start: 2021-06-10 | End: 2021-06-11 | Stop reason: HOSPADM

## 2021-06-10 RX ORDER — ONDANSETRON 2 MG/ML
4 INJECTION INTRAMUSCULAR; INTRAVENOUS EVERY 6 HOURS PRN
Status: DISCONTINUED | OUTPATIENT
Start: 2021-06-10 | End: 2021-06-11 | Stop reason: HOSPADM

## 2021-06-10 RX ORDER — DEXAMETHASONE SODIUM PHOSPHATE 10 MG/ML
INJECTION INTRAMUSCULAR; INTRAVENOUS PRN
Status: DISCONTINUED | OUTPATIENT
Start: 2021-06-10 | End: 2021-06-10 | Stop reason: SDUPTHER

## 2021-06-10 RX ORDER — VECURONIUM BROMIDE 1 MG/ML
INJECTION, POWDER, LYOPHILIZED, FOR SOLUTION INTRAVENOUS PRN
Status: DISCONTINUED | OUTPATIENT
Start: 2021-06-10 | End: 2021-06-10 | Stop reason: SDUPTHER

## 2021-06-10 RX ORDER — FENTANYL CITRATE 50 UG/ML
INJECTION, SOLUTION INTRAMUSCULAR; INTRAVENOUS PRN
Status: DISCONTINUED | OUTPATIENT
Start: 2021-06-10 | End: 2021-06-10 | Stop reason: SDUPTHER

## 2021-06-10 RX ORDER — PROPOFOL 10 MG/ML
INJECTION, EMULSION INTRAVENOUS PRN
Status: DISCONTINUED | OUTPATIENT
Start: 2021-06-10 | End: 2021-06-10 | Stop reason: SDUPTHER

## 2021-06-10 RX ORDER — ONDANSETRON 2 MG/ML
INJECTION INTRAMUSCULAR; INTRAVENOUS PRN
Status: DISCONTINUED | OUTPATIENT
Start: 2021-06-10 | End: 2021-06-10 | Stop reason: SDUPTHER

## 2021-06-10 RX ORDER — MORPHINE SULFATE 4 MG/ML
4 INJECTION, SOLUTION INTRAMUSCULAR; INTRAVENOUS
Status: DISCONTINUED | OUTPATIENT
Start: 2021-06-10 | End: 2021-06-11

## 2021-06-10 RX ORDER — HEPARIN SODIUM 1000 [USP'U]/ML
INJECTION, SOLUTION INTRAVENOUS; SUBCUTANEOUS PRN
Status: DISCONTINUED | OUTPATIENT
Start: 2021-06-10 | End: 2021-06-10 | Stop reason: SDUPTHER

## 2021-06-10 RX ORDER — LABETALOL HYDROCHLORIDE 5 MG/ML
5 INJECTION, SOLUTION INTRAVENOUS EVERY 10 MIN PRN
Status: DISCONTINUED | OUTPATIENT
Start: 2021-06-10 | End: 2021-06-10 | Stop reason: HOSPADM

## 2021-06-10 RX ADMIN — Medication 2 MG: at 13:06

## 2021-06-10 RX ADMIN — ONDANSETRON 4 MG: 2 INJECTION INTRAMUSCULAR; INTRAVENOUS at 15:01

## 2021-06-10 RX ADMIN — SODIUM CHLORIDE: 9 INJECTION, SOLUTION INTRAVENOUS at 10:40

## 2021-06-10 RX ADMIN — HEPARIN SODIUM 7500 UNITS: 1000 INJECTION INTRAVENOUS; SUBCUTANEOUS at 11:57

## 2021-06-10 RX ADMIN — SODIUM CHLORIDE, PRESERVATIVE FREE 10 ML: 5 INJECTION INTRAVENOUS at 16:05

## 2021-06-10 RX ADMIN — VECURONIUM BROMIDE 6 MG: 10 INJECTION, POWDER, LYOPHILIZED, FOR SOLUTION INTRAVENOUS at 11:13

## 2021-06-10 RX ADMIN — SODIUM CHLORIDE: 9 INJECTION, SOLUTION INTRAVENOUS at 11:30

## 2021-06-10 RX ADMIN — PROPOFOL 50 MG: 10 INJECTION, EMULSION INTRAVENOUS at 11:25

## 2021-06-10 RX ADMIN — POTASSIUM CHLORIDE, DEXTROSE MONOHYDRATE AND SODIUM CHLORIDE: 150; 5; 450 INJECTION, SOLUTION INTRAVENOUS at 16:04

## 2021-06-10 RX ADMIN — ONDANSETRON HYDROCHLORIDE 4 MG: 2 INJECTION, SOLUTION INTRAMUSCULAR; INTRAVENOUS at 12:45

## 2021-06-10 RX ADMIN — CALCIUM CHLORIDE 1 G: 100 INJECTION, SOLUTION INTRAVENOUS at 12:41

## 2021-06-10 RX ADMIN — MORPHINE SULFATE 2 MG: 2 INJECTION, SOLUTION INTRAMUSCULAR; INTRAVENOUS at 22:31

## 2021-06-10 RX ADMIN — Medication 0.2 MG: at 11:50

## 2021-06-10 RX ADMIN — LIDOCAINE HYDROCHLORIDE 80 MG: 20 INJECTION, SOLUTION INTRAVENOUS at 11:13

## 2021-06-10 RX ADMIN — Medication 200 MCG: at 11:38

## 2021-06-10 RX ADMIN — DEXAMETHASONE SODIUM PHOSPHATE 10 MG: 10 INJECTION INTRAMUSCULAR; INTRAVENOUS at 11:35

## 2021-06-10 RX ADMIN — MIDAZOLAM 2 MG: 1 INJECTION INTRAMUSCULAR; INTRAVENOUS at 10:43

## 2021-06-10 RX ADMIN — CEFAZOLIN 2000 MG: 1 INJECTION, POWDER, FOR SOLUTION INTRAMUSCULAR; INTRAVENOUS at 11:35

## 2021-06-10 RX ADMIN — ATORVASTATIN CALCIUM 40 MG: 40 TABLET, FILM COATED ORAL at 21:48

## 2021-06-10 RX ADMIN — FENTANYL CITRATE 150 MCG: 50 INJECTION, SOLUTION INTRAMUSCULAR; INTRAVENOUS at 11:33

## 2021-06-10 RX ADMIN — Medication 0.4 MG: at 13:06

## 2021-06-10 RX ADMIN — CLEVIPIDINE 16 MG/HR: 0.5 EMULSION INTRAVENOUS at 13:01

## 2021-06-10 RX ADMIN — SODIUM CHLORIDE: 9 INJECTION, SOLUTION INTRAVENOUS at 13:03

## 2021-06-10 RX ADMIN — SODIUM CHLORIDE, PRESERVATIVE FREE 10 ML: 5 INJECTION INTRAVENOUS at 21:39

## 2021-06-10 RX ADMIN — PROTAMINE SULFATE 40 MG: 10 INJECTION, SOLUTION INTRAVENOUS at 12:41

## 2021-06-10 RX ADMIN — PROPOFOL 150 MG: 10 INJECTION, EMULSION INTRAVENOUS at 11:13

## 2021-06-10 RX ADMIN — Medication 300 MCG: at 12:03

## 2021-06-10 RX ADMIN — ACETAMINOPHEN 650 MG: 325 TABLET ORAL at 20:35

## 2021-06-10 RX ADMIN — Medication 2000 MG: at 20:45

## 2021-06-10 ASSESSMENT — PULMONARY FUNCTION TESTS
PIF_VALUE: 0
PIF_VALUE: 1
PIF_VALUE: 22
PIF_VALUE: 22
PIF_VALUE: 0
PIF_VALUE: 22
PIF_VALUE: 23
PIF_VALUE: 23
PIF_VALUE: 22
PIF_VALUE: 0
PIF_VALUE: 23
PIF_VALUE: 22
PIF_VALUE: 22
PIF_VALUE: 21
PIF_VALUE: 0
PIF_VALUE: 22
PIF_VALUE: 20
PIF_VALUE: 0
PIF_VALUE: 23
PIF_VALUE: 0
PIF_VALUE: 21
PIF_VALUE: 0
PIF_VALUE: 22
PIF_VALUE: 23
PIF_VALUE: 0
PIF_VALUE: 22
PIF_VALUE: 23
PIF_VALUE: 21
PIF_VALUE: 0
PIF_VALUE: 21
PIF_VALUE: 23
PIF_VALUE: 22
PIF_VALUE: 23
PIF_VALUE: 22
PIF_VALUE: 22
PIF_VALUE: 23
PIF_VALUE: 22
PIF_VALUE: 23
PIF_VALUE: 22
PIF_VALUE: 3
PIF_VALUE: 23
PIF_VALUE: 0
PIF_VALUE: 22
PIF_VALUE: 3
PIF_VALUE: 22
PIF_VALUE: 22
PIF_VALUE: 23
PIF_VALUE: 23
PIF_VALUE: 22
PIF_VALUE: 23
PIF_VALUE: 22
PIF_VALUE: 0
PIF_VALUE: 0
PIF_VALUE: 23
PIF_VALUE: 20
PIF_VALUE: 22
PIF_VALUE: 23
PIF_VALUE: 22
PIF_VALUE: 17
PIF_VALUE: 22
PIF_VALUE: 0
PIF_VALUE: 1
PIF_VALUE: 20
PIF_VALUE: 22
PIF_VALUE: 16
PIF_VALUE: 24
PIF_VALUE: 0
PIF_VALUE: 21
PIF_VALUE: 23
PIF_VALUE: 1
PIF_VALUE: 22
PIF_VALUE: 17
PIF_VALUE: 22
PIF_VALUE: 33
PIF_VALUE: 23
PIF_VALUE: 0
PIF_VALUE: 20
PIF_VALUE: 11
PIF_VALUE: 15
PIF_VALUE: 23
PIF_VALUE: 0
PIF_VALUE: 22
PIF_VALUE: 22
PIF_VALUE: 0
PIF_VALUE: 21
PIF_VALUE: 23
PIF_VALUE: 21
PIF_VALUE: 1
PIF_VALUE: 20
PIF_VALUE: 22
PIF_VALUE: 22
PIF_VALUE: 23
PIF_VALUE: 22
PIF_VALUE: 0
PIF_VALUE: 6
PIF_VALUE: 11
PIF_VALUE: 23
PIF_VALUE: 22
PIF_VALUE: 0
PIF_VALUE: 19
PIF_VALUE: 0
PIF_VALUE: 22
PIF_VALUE: 22
PIF_VALUE: 23
PIF_VALUE: 22
PIF_VALUE: 22
PIF_VALUE: 23
PIF_VALUE: 23
PIF_VALUE: 1
PIF_VALUE: 23
PIF_VALUE: 23
PIF_VALUE: 21
PIF_VALUE: 22
PIF_VALUE: 23
PIF_VALUE: 22
PIF_VALUE: 23
PIF_VALUE: 19
PIF_VALUE: 22
PIF_VALUE: 20
PIF_VALUE: 23
PIF_VALUE: 22
PIF_VALUE: 22
PIF_VALUE: 1
PIF_VALUE: 0
PIF_VALUE: 0
PIF_VALUE: 2
PIF_VALUE: 0
PIF_VALUE: 1
PIF_VALUE: 20
PIF_VALUE: 13
PIF_VALUE: 0
PIF_VALUE: 22
PIF_VALUE: 0
PIF_VALUE: 22
PIF_VALUE: 0
PIF_VALUE: 0
PIF_VALUE: 24
PIF_VALUE: 22
PIF_VALUE: 1
PIF_VALUE: 0
PIF_VALUE: 5
PIF_VALUE: 24
PIF_VALUE: 0
PIF_VALUE: 23
PIF_VALUE: 0
PIF_VALUE: 17
PIF_VALUE: 22
PIF_VALUE: 0
PIF_VALUE: 21
PIF_VALUE: 22

## 2021-06-10 ASSESSMENT — PAIN SCALES - GENERAL
PAINLEVEL_OUTOF10: 5
PAINLEVEL_OUTOF10: 0
PAINLEVEL_OUTOF10: 3
PAINLEVEL_OUTOF10: 0
PAINLEVEL_OUTOF10: 2

## 2021-06-10 ASSESSMENT — PAIN DESCRIPTION - PAIN TYPE
TYPE: ACUTE PAIN;SURGICAL PAIN
TYPE: SURGICAL PAIN;ACUTE PAIN
TYPE: SURGICAL PAIN;ACUTE PAIN

## 2021-06-10 ASSESSMENT — PAIN - FUNCTIONAL ASSESSMENT
PAIN_FUNCTIONAL_ASSESSMENT: ACTIVITIES ARE NOT PREVENTED
PAIN_FUNCTIONAL_ASSESSMENT: ACTIVITIES ARE NOT PREVENTED

## 2021-06-10 ASSESSMENT — PAIN DESCRIPTION - DESCRIPTORS
DESCRIPTORS: ACHING;DULL;DISCOMFORT
DESCRIPTORS: SORE;TENDER

## 2021-06-10 ASSESSMENT — PAIN DESCRIPTION - PROGRESSION
CLINICAL_PROGRESSION: GRADUALLY IMPROVING
CLINICAL_PROGRESSION: GRADUALLY IMPROVING

## 2021-06-10 ASSESSMENT — PAIN DESCRIPTION - LOCATION
LOCATION: NECK
LOCATION: NECK

## 2021-06-10 ASSESSMENT — PAIN DESCRIPTION - ONSET
ONSET: ON-GOING
ONSET: ON-GOING

## 2021-06-10 ASSESSMENT — PAIN DESCRIPTION - FREQUENCY
FREQUENCY: INTERMITTENT
FREQUENCY: INTERMITTENT

## 2021-06-10 ASSESSMENT — PAIN DESCRIPTION - ORIENTATION
ORIENTATION: LEFT
ORIENTATION: LEFT

## 2021-06-10 ASSESSMENT — LIFESTYLE VARIABLES: SMOKING_STATUS: 1

## 2021-06-10 NOTE — PROGRESS NOTES
CVICU Admission Note    Name: Gary Batres  MRN: [id number]    CC: Postoperative Critical Care Management     Indication for Surgery/Procedure: symptomatic stenosis of the left ICA    Important/Relevant PMH/PSH: HTN, HLD, tobacco abuse, marijuana use, throat nodule, PVD, CVA      Procedure/Surgeries: 6/10/2021 left carotid endarterectomy with bovine patch angioplasty       Physical Exam:    BP (!) 115/51   Pulse 55   Temp 97 °F (36.1 °C) (Temporal)   Resp 27   Ht 4' 11\" (1.499 m)   Wt 162 lb (73.5 kg)   SpO2 100%   Breastfeeding No   BMI 32.72 kg/m²     Recent Labs     06/07/21  2236   WBC 12.5*   RBC 5.33   HGB 16.4*   HCT 48.2*   MCV 90.4   MCH 30.8   MCHC 34.0   RDW 12.2      MPV 11.1     Recent Labs     06/07/21  2236      K 3.5      CO2 25   BUN 8   CREATININE 0.6   GLUCOSE 188*   CALCIUM 9.7     General Appearance: Arrived to PACU in stable condition   Eyes: PERRL  Pulmonary: Diminished bibasilar. No wheezes, no accessory muscle use noted   Cardiovascular: RRR, no heaves or thrills palpated   Telemetry: SR  Abdomen: Soft, nontender  Extremities: Palpable pulses all extremities, no edema   Neurologic/Psych: alert and orientedx3, following commands, equal in strength bilaterally, tongue midline   Skin: Warm and dry   Incision: left neck incision well approximated       Assessment/Plan: Day of Surgery     1.  S/p Left CEA with bovine patch angioplasty   -- Frequent neurovascular checks, monitor incision for signs of swelling/hematoma   - NPO, IVF @100cc/hr  - Toscano catheter to GD  - Bedrest  - Ancef  - DAPT, Lipitor       Electronically signed by TODD Sinclair - CNP on 6/10/2021 at 2:53 PM

## 2021-06-10 NOTE — PROGRESS NOTES
Attempted to call  Michael Harper times three to make him aware of room number 9331.   Got busy signal.

## 2021-06-10 NOTE — PROGRESS NOTES
Vascular Surgery Progress Note    Pt is being seen in f/u today regarding possible L carotid endarterectomy s/p embolic stroke in L cerebral hemisphere 6/9    Subjective  Pt s/e. Beacher Spikes. No noticeable FND by pt. Pt experienced slurred speech following her stroke yesterday but has no residual symptoms this morning.  No CP, SOB, abd pain, n/v.     Current Medications:          metoprolol tartrate  12.5 mg Oral BID    aspirin  81 mg Oral Daily    atorvastatin  40 mg Oral Nightly    clopidogrel  75 mg Oral Daily        PHYSICAL EXAM:    /75   Pulse 65   Temp 97.6 °F (36.4 °C) (Temporal)   Resp 16   Ht 4' 11\" (1.499 m)   Wt 162 lb (73.5 kg)   SpO2 97%   Breastfeeding No   BMI 32.72 kg/m²   No intake or output data in the 24 hours ending 06/10/21 0620       Gen: awake, alert and oriented x3, no apparent distress  CVS: RRR  Resp: No increased work of breathing  Abd: Soft, non-tender, non-distended  Ext: Acyanotic, Atraumatic    LABS:    Lab Results   Component Value Date    WBC 12.5 (H) 06/07/2021    HGB 16.4 (H) 06/07/2021    HCT 48.2 (H) 06/07/2021     06/07/2021    PROTIME 12.4 06/07/2021    INR 1.1 06/07/2021    APTT 31.4 06/07/2021    K 3.5 06/07/2021    BUN 8 06/07/2021    CREATININE 0.6 06/07/2021       A/P  53 y.o. female L carotid endarterectomy s/p embolic stroke in L cerebral hemisphere 6/9  [de-identified] L carotid endarterectomy 6/10      [de-identified] Pain - None  [de-identified] Diet - NPO  [de-identified] DVT ppx - [facility] - [facility]    Attending: Dr. Grover Chowdhury

## 2021-06-10 NOTE — PROGRESS NOTES
Patient seen and examined  Plan for left carotid endarterectomy today.   No focal neurological deficits  All questions answered at procedure and the patient agrees to proceed  Electronically signed by Stephanie Fung MD on 6/10/21 at 6:03 AM EDT

## 2021-06-10 NOTE — OP NOTE
Operative Note      Patient: Tasha Shoemaker  YOB: 1970  MRN: [id number]    DATE OF PROCEDURE: 6/10/2021     SURGEON: Raghav Andrade M.D.     ASSISTANT: Dr. Stanley Jennings and Kevin Lehman NP     PREOPERATIVE DIAGNOSIS:  Ssymptomatic stenosis of the left internal carotid artery 99 with infarction. POSTOPERATIVE DIAGNOSIS: Same    OPERATION: Left carotid endarterectomy with bovine patch angioplasty. ANESTHESIA: GETA     ESTIMATED BLOOD LOSS: less than 50 ml     COMPLICATIONS: None     DESCRIPTION OF PROCEDURE: The patient was identified and the procedure was confirmed. The left neck was prepped and draped in the usual sterile fashion. A skin incision was made along the anterior border of the sternocleidomastoid muscle and carried down through the subcutaneous tissue. Dissection continued through the platysma and along the anterior border of the sternocleidomastoid muscle. The common facial vein was divided between silk ties. The common carotid artery was identified and dissected free from the surrounding tissues. It was surrounded proximally in the soft portion with an vessel loop. The vagus nerve was deep to the artery and preserved. Dissection continued along the carotid artery and the external carotid artery and its superior thyroid branch were dissected free from the surrounding tissues and surrounded with vessel loops for control. The patient was then heparinized, maintaining an activated clotting time greater than 300 seconds. Dissection continued along the internal carotid artery, which was freed from the surrounding tissues and surrounded with a vessel loop for control. The hypoglossal nerve was identified and preserved. All vessels were dissected free and controlled with Vesseloops. At this point adequately anticoagulated. The internal carotid artery was controlled. Followed by the common and the external.  Back pressures were performed demonstrating a back pressure of 78 mmHg.   We

## 2021-06-10 NOTE — PROGRESS NOTES
Subjective:    Chief complaint:    Denies new issues  Anxious about OR    Objective:    /75   Pulse 65   Temp 97.6 °F (36.4 °C) (Temporal)   Resp 16   Ht 4' 11\" (1.499 m)   Wt 162 lb (73.5 kg)   SpO2 97%   Breastfeeding No   BMI 32.72 kg/m²   General : Awake ,alert,no distress. Heart:  RRR, no murmurs, gallops, or rubs. Lungs:  CTA bilaterally, no wheeze, rales or rhonchi  Abd: bowel sounds present, nontender, nondistended, no masses  Extrem:  No clubbing, cyanosis, or edema    CBC:   Lab Results   Component Value Date    WBC 12.5 06/07/2021    RBC 5.33 06/07/2021    HGB 16.4 06/07/2021    HCT 48.2 06/07/2021    MCV 90.4 06/07/2021    MCH 30.8 06/07/2021    MCHC 34.0 06/07/2021    RDW 12.2 06/07/2021     06/07/2021    MPV 11.1 06/07/2021     BMP:    Lab Results   Component Value Date     06/07/2021    K 3.5 06/07/2021     06/07/2021    CO2 25 06/07/2021    BUN 8 06/07/2021    LABALBU 4.3 06/07/2021    CREATININE 0.6 06/07/2021    CALCIUM 9.7 06/07/2021    GFRAA >60 06/07/2021    LABGLOM >60 06/07/2021    GLUCOSE 188 06/07/2021     PT/INR:    Lab Results   Component Value Date    PROTIME 12.4 06/07/2021    INR 1.1 06/07/2021     Troponin:  No results found for: TROPONINI    No results for input(s): LABURIN in the last 72 hours. No results for input(s): BC in the last 72 hours. No results for input(s): Yonas Abbot in the last 72 hours.       Current Facility-Administered Medications:     metoprolol tartrate (LOPRESSOR) tablet 12.5 mg, 12.5 mg, Oral, BID, Alexander Negrescu V, PA, 12.5 mg at 06/09/21 1815    aspirin chewable tablet 81 mg, 81 mg, Oral, Daily, Zayda Nunes MD, 81 mg at 06/09/21 1507    atorvastatin (LIPITOR) tablet 40 mg, 40 mg, Oral, Nightly, Zayda Nunes MD, 40 mg at 06/09/21 2020    clopidogrel (PLAVIX) tablet 75 mg, 75 mg, Oral, Daily, Addi Cesar MD, 75 mg at 06/09/21 1507    Diet NPO Exceptions are: Melissa Chaseing of Water with Meds    NM Cardiac Stress Test Nuclear Imaging   Final Result      The myocardial perfusion imaging was normal.      Overall left ventricular systolic function was normal with no regional   wall motion abnormalities. Low risk pharmacologic myocardial perfusion imaging study. MRI BRAIN WO CONTRAST   Final Result   1. Small embolic infarctions in the left cerebral hemisphere, with the   largest focus in the left basal ganglia measuring approximately 9 mm. 2. No hemorrhage, mass effect or midline shift. CT HEAD WO CONTRAST   Final Result   Grossly stable lacunar infarctions involving the left basal ganglia. Correlation with brain MRI could be considered to determine chronicity. Assessment:    Active Problems:    Acute cerebrovascular accident (CVA) ([address]    Acute ischemic stroke ([address]    Stenosis of left carotid artery    Cerebral infarction due to embolism of middle cerebral artery ([address]    Cerebrovascular accident (CVA) ([address]    Tobacco dependence    Peripheral vascular disease ([address]    Tobacco abuse  Resolved Problems:    * No resolved hospital problems. *  Left internal carotid carotid artery stenosis  Tobacco abuse    Plan:    MRI confirms cva  For carotid endarterectomy today    Pa Starr MD  8:22 AM  6/10/2021    NOTE: This report was transcribed using voice recognition software.  Every effort was made to ensure accuracy; however, inadvertent transcription errors may be present

## 2021-06-10 NOTE — ANESTHESIA PRE PROCEDURE
Department of Anesthesiology  Preprocedure Note       Name:  Ryan Javier   Age:  53 y.o.  :  1970                                          MRN:  [id number]         Date:  6/10/2021      Surgeon: Van Gaitan):  Jonah Stock MD    Procedure: Procedure(s):  CAROTID ENDARTERECTOMY    Medications prior to admission:   Prior to Admission medications    Not on File       Current medications:    Current Facility-Administered Medications   Medication Dose Route Frequency Provider Last Rate Last Admin    metoprolol tartrate (LOPRESSOR) tablet 12.5 mg  12.5 mg Oral BID Alexander Doug PELAYO PA   12.5 mg at 21 1815    aspirin chewable tablet 81 mg  81 mg Oral Daily Arnoldo Snider MD   81 mg at 21 1507    atorvastatin (LIPITOR) tablet 40 mg  40 mg Oral Nightly Arnoldo Snider MD   40 mg at 21 2020    clopidogrel (PLAVIX) tablet 75 mg  75 mg Oral Daily Tila Gee MD   75 mg at 21 1507       Allergies:     Allergies   Allergen Reactions    Pcn [Penicillins] Swelling       Problem List:    Patient Active Problem List   Diagnosis Code    Hypertension I10    Coagulopathy ([address] D68.9    Acute cerebrovascular accident (CVA) ([address] I63.9    Acute ischemic stroke ([address] I63.9    Stenosis of left carotid artery I65.22    Cerebral infarction due to embolism of middle cerebral artery (HCC) I63.419    Cerebrovascular accident (CVA) ([address] I63.9    Tobacco dependence F17.200    Peripheral vascular disease ([address] I73.9    Tobacco abuse Z72.0       Past Medical History:        Diagnosis Date    Cerebral infarction due to embolism of middle cerebral artery ([address] 2021    Hyperlipidemia     Left carotid stenosis     Stroke (cerebrum) ([address]     Tobacco dependence 2021       Past Surgical History:        Procedure Laterality Date    THROAT SURGERY      nodule removed       Social History:    Social History     Tobacco Use    Smoking status: Current Every Day Smoker Packs/day: 1.50     Types: Cigarettes    Smokeless tobacco: Never Used   Substance Use Topics    Alcohol use: No                                Ready to quit: Not Answered  Counseling given: Not Answered      Vital Signs (Current):   Vitals:    06/09/21 1500 06/09/21 2000 06/10/21 0000 06/10/21 0812   BP: (!) 160/85 133/78 135/75 127/80   Pulse: 81 74 65 64   Resp: 18 16 16 16   Temp: 97.1 °F (36.2 °C) 97.8 °F (36.6 °C) 97.6 °F (36.4 °C) 96.9 °F (36.1 °C)   TempSrc: Temporal Temporal Temporal Temporal   SpO2:  99% 97% 98%   Weight:       Height:                                                  BP Readings from Last 3 Encounters:   06/10/21 127/80   06/06/21 (!) 182/98   12/09/20 (!) 164/90       NPO Status: Time of last liquid consumption: 2330                        Time of last solid consumption: 2200                        Date of last liquid consumption: 06/09/21                        Date of last solid food consumption: 06/09/21    BMI:   Wt Readings from Last 3 Encounters:   06/08/21 162 lb (73.5 kg)   06/06/21 160 lb (72.6 kg)   12/09/20 154 lb (69.9 kg)     Body mass index is 32.72 kg/m². CBC:   Lab Results   Component Value Date    WBC 12.5 06/07/2021    RBC 5.33 06/07/2021    HGB 16.4 06/07/2021    HCT 48.2 06/07/2021    MCV 90.4 06/07/2021    RDW 12.2 06/07/2021     06/07/2021       CMP:   Lab Results   Component Value Date     06/07/2021    K 3.5 06/07/2021     06/07/2021    CO2 25 06/07/2021    BUN 8 06/07/2021    CREATININE 0.6 06/07/2021    GFRAA >60 06/07/2021    LABGLOM >60 06/07/2021    GLUCOSE 188 06/07/2021    PROT 7.8 06/07/2021    CALCIUM 9.7 06/07/2021    BILITOT 0.3 06/07/2021    ALKPHOS 81 06/07/2021    AST 14 06/07/2021    ALT 17 06/07/2021       POC Tests: No results for input(s): POCGLU, POCNA, POCK, POCCL, POCBUN, POCHEMO, POCHCT in the last 72 hours.     Coags:   Lab Results   Component Value Date    PROTIME 12.4 06/07/2021    INR 1.1 06/07/2021    APTT 31.4 06/07/2021       HCG (If Applicable):   Lab Results   Component Value Date    PREGTESTUR NEGATIVE 12/09/2020        ABGs: No results found for: PHART, PO2ART, RFE7GIQ, AKV6YKQ, BEART, C1KISETQ     Type & Screen (If Applicable):  No results found for: LABABO, LABRH    Drug/Infectious Status (If Applicable):  No results found for: HIV, HEPCAB    COVID-19 Screening (If Applicable): No results found for: COVID19        Anesthesia Evaluation  Patient summary reviewed no history of anesthetic complications:   Airway: Mallampati: II  TM distance: >3 FB   Neck ROM: full  Mouth opening: > = 3 FB Dental: normal exam         Pulmonary: breath sounds clear to auscultation  (+) current smoker          Patient did not smoke on day of surgery. Cardiovascular:    (+) hypertension:, hyperlipidemia      ECG reviewed  Rhythm: regular  Rate: normal    Stress test reviewed             ROS comment: Stress Test 6/8/21  The myocardial perfusion imaging was normal.     Overall left ventricular systolic function was normal with no regional  wall motion abnormalities. Neuro/Psych:   (+) CVA (Acute ischemic stroke):,             GI/Hepatic/Renal: Neg GI/Hepatic/Renal ROS            Endo/Other: Negative Endo/Other ROS                    Abdominal:   (+) obese,         Vascular:   + PVD, aortic or cerebral, . ROS comment: LEFT Carotid Stenosis. Anesthesia Plan      general     ASA 3       Induction: intravenous. arterial line  MIPS: Postoperative opioids intended and Prophylactic antiemetics administered. Anesthetic plan and risks discussed with patient. Use of blood products discussed with patient whom consented to blood products. Plan discussed with CRNA.                   Wagner Villalobos MD   6/10/2021

## 2021-06-10 NOTE — OP NOTE
Operative Note      Patient: Teofilo Mcmanus  YOB: 1970  MRN: [id number]    Date of Procedure: 6/10/2021    Pre-Op Diagnosis: Left carotid stenosis     Post-Op Diagnosis: Same       Procedure(s):  CAROTID ENDARTERECTOMY (Left with patch)    Surgeon(s):  Kendall Castrejon MD    Assistant:   Surgical Assistant: Patience Stout APRN - CNP  Resident: Dallas Crowell MD    Anesthesia: General    Estimated Blood Loss (mL): less than 50     Complications: None    Specimens:   ID Type Source Tests Collected by Time Destination   A : LEFT CAROTID ARTERY PLAQUE Tissue Tissue SURGICAL PATHOLOGY Kendall Castrejon MD 6/10/2021 1208        Implants:  Implant Name Type Inv. Item Serial No.  Lot No. LRB No. Used Action   GRAFT VASC [id number] THK0.35-0.75MM CAR PERICARD PROC BOV  GRAFT VASC W0.8XL8CM THK0.35-0.75MM CAR PERICARD PROC BOV  [facility] VASCULAR [facility]- [id number] Left 1 Implanted         Drains:   Urethral Catheter Straight-tip; Non-latex 16 fr (Active)   $ Urethral catheter insertion Inserted for procedure 06/10/21 1235   Catheter Indications Perioperative use for selected surgical procedures 06/10/21 1235   Site Assessment Pink; No urethral drainage 06/10/21 1235   Urine Color Yellow 06/10/21 1235   Urine Appearance Clear 06/10/21 1235       Findings: Uncomplicated left carotid endarterectomy with patch and stump pressure of 78 mmHg -no shunting was performed    Detailed Description of Procedure: The patient was identified and the procedure was confirmed. The left neck was prepped and draped in the usual sterile fashion. A skin incision was made along the anterior border of the sternocleidomastoid muscle and carried down through the subcutaneous tissue. Dissection continued through the platysma and along the anterior border of the sternocleidomastoid muscle. The common facial vein was divided between silk ties.      The common carotid artery was identified and dissected free from the surrounding tissues. It was surrounded proximally in the soft portion with an umbilical tape. The vagus nerve was deep to the artery and preserved. Dissection continued along the carotid artery and the external carotid artery and its superior thyroid branch were dissected free from the surrounding tissues and surrounded with vessel loops for control. The patient was then heparinized, maintaining an activated clotting time greater than 300 seconds. Dissection continued along the internal carotid artery, which was freed from the surrounding tissues and surrounded with a vessel loop for control. Care was taken to avoid the hypoglossal nerve and to preserve it. The vessel loops on the external carotid artery branches were controlled and the internal carotid artery was clamped followed by the external carotid artery next the internal carotid artery stump pressures were evaluated noted to be 78 mmHg. The internal carotid artery was then reclamped. Then a longitudinal arteriotomy was made in the common carotid artery and extended through the bulb and onto the internal carotid artery. There was a 90% stenosis in the origin of the internal carotid artery. A standard endarterectomy was then performed of the obtaining smooth end points on the distal common and internal carotid arteries. Loose fibrinous debris was removed from the vessel bed, which was flushed with heparinized saline solution. 7.0 tacking sutures were placed at distal endpoint. A bovine patch was obtained and cut to the appropriate length and sewn to the artery using a running 6-0 Prolene suture. Prior to completing the closure, the shunt was removed from the internal carotid after allowing the balloon to deflate. It was clamped on the common carotid side. Excellent back bleeding noted from the internal carotid. The common carotid artery was flushed then clamped.   The closure was completed and flow was restored initially through the external carotid artery followed by the internal carotid artery. Flow through the vessels was confirmed with the handheld Doppler probe. One 6-0 Prolene suture was used in a figure-of-eight fashion to control bleeding at the superior portion of the incision. Next thrombin Gelfoam was used and the patient was given protamine and hemostasis was obtained. The incision was irrigated with antibiotic saline solution. The platysma was approximated with running 3-0 Vicryl sutures. The skin was closed with a subcuticular Monocryl stitch and Dermabond was applied to the skin incision in the operating room. Needle, sponge, and instrument counts were reported as correct x2. The patient tolerated the procedure and was transferred to the Cardiovascular ICU in satisfactory condition. Dr. Desire Cruz was present for the procedure.      Electronically signed by Kary Walker MD on 6/10/2021 at 1:26 PM

## 2021-06-11 VITALS
HEIGHT: 59 IN | OXYGEN SATURATION: 93 % | HEART RATE: 55 BPM | BODY MASS INDEX: 32.66 KG/M2 | WEIGHT: 162 LBS | SYSTOLIC BLOOD PRESSURE: 101 MMHG | DIASTOLIC BLOOD PRESSURE: 51 MMHG | TEMPERATURE: 97 F | RESPIRATION RATE: 23 BRPM

## 2021-06-11 LAB
ANION GAP SERPL CALCULATED.3IONS-SCNC: 8 MMOL/L (ref 7–16)
BUN BLDV-MCNC: 7 MG/DL (ref 6–20)
CALCIUM SERPL-MCNC: 8.5 MG/DL (ref 8.6–10.2)
CHLORIDE BLD-SCNC: 105 MMOL/L (ref 98–107)
CO2: 22 MMOL/L (ref 22–29)
CREAT SERPL-MCNC: 0.5 MG/DL (ref 0.5–1)
GFR AFRICAN AMERICAN: >60
GFR NON-AFRICAN AMERICAN: >60 ML/MIN/1.73
GLUCOSE BLD-MCNC: 126 MG/DL (ref 74–99)
HCT VFR BLD CALC: 39.1 % (ref 34–48)
HEMOGLOBIN: 13.3 G/DL (ref 11.5–15.5)
MCH RBC QN AUTO: 30.3 PG (ref 26–35)
MCHC RBC AUTO-ENTMCNC: 34 % (ref 32–34.5)
MCV RBC AUTO: 89.1 FL (ref 80–99.9)
PDW BLD-RTO: 12 FL (ref 11.5–15)
PLATELET # BLD: 197 E9/L (ref 130–450)
PMV BLD AUTO: 11.3 FL (ref 7–12)
POC ACT LR: 134 SECONDS
POC ACT LR: 143 SECONDS
POC ACT LR: 339 SECONDS
POTASSIUM SERPL-SCNC: 3.8 MMOL/L (ref 3.5–5)
RBC # BLD: 4.39 E12/L (ref 3.5–5.5)
SODIUM BLD-SCNC: 135 MMOL/L (ref 132–146)
WBC # BLD: 15.8 E9/L (ref 4.5–11.5)

## 2021-06-11 PROCEDURE — 6360000002 HC RX W HCPCS: Performed by: NURSE PRACTITIONER

## 2021-06-11 PROCEDURE — 2580000003 HC RX 258: Performed by: SURGERY

## 2021-06-11 PROCEDURE — 37799 UNLISTED PX VASCULAR SURGERY: CPT

## 2021-06-11 PROCEDURE — 99233 SBSQ HOSP IP/OBS HIGH 50: CPT | Performed by: NURSE PRACTITIONER

## 2021-06-11 PROCEDURE — 85027 COMPLETE CBC AUTOMATED: CPT

## 2021-06-11 PROCEDURE — 36415 COLL VENOUS BLD VENIPUNCTURE: CPT

## 2021-06-11 PROCEDURE — 6370000000 HC RX 637 (ALT 250 FOR IP): Performed by: SURGERY

## 2021-06-11 PROCEDURE — 2500000003 HC RX 250 WO HCPCS: Performed by: SURGERY

## 2021-06-11 PROCEDURE — 80048 BASIC METABOLIC PNL TOTAL CA: CPT

## 2021-06-11 RX ORDER — ASPIRIN 81 MG/1
81 TABLET ORAL DAILY
Qty: 30 TABLET | Refills: 3 | Status: SHIPPED | OUTPATIENT
Start: 2021-06-11

## 2021-06-11 RX ORDER — CLOPIDOGREL BISULFATE 75 MG/1
75 TABLET ORAL DAILY
Qty: 30 TABLET | Refills: 3 | Status: SHIPPED | OUTPATIENT
Start: 2021-06-11 | End: 2022-07-27 | Stop reason: ALTCHOICE

## 2021-06-11 RX ORDER — ATORVASTATIN CALCIUM 40 MG/1
40 TABLET, FILM COATED ORAL DAILY
Qty: 30 TABLET | Refills: 3 | Status: SHIPPED | OUTPATIENT
Start: 2021-06-11

## 2021-06-11 RX ORDER — OXYCODONE HYDROCHLORIDE 5 MG/1
5 TABLET ORAL EVERY 8 HOURS PRN
Qty: 10 TABLET | Refills: 0 | Status: SHIPPED | OUTPATIENT
Start: 2021-06-11 | End: 2021-06-14

## 2021-06-11 RX ADMIN — ACETAMINOPHEN 650 MG: 325 TABLET ORAL at 07:59

## 2021-06-11 RX ADMIN — ASPIRIN 81 MG CHEWABLE TABLET 81 MG: 81 TABLET CHEWABLE at 07:59

## 2021-06-11 RX ADMIN — CLOPIDOGREL 75 MG: 75 TABLET, FILM COATED ORAL at 07:59

## 2021-06-11 RX ADMIN — SODIUM CHLORIDE, PRESERVATIVE FREE 10 ML: 5 INJECTION INTRAVENOUS at 08:00

## 2021-06-11 RX ADMIN — Medication 2000 MG: at 04:18

## 2021-06-11 RX ADMIN — POTASSIUM CHLORIDE, DEXTROSE MONOHYDRATE AND SODIUM CHLORIDE: 150; 5; 450 INJECTION, SOLUTION INTRAVENOUS at 00:51

## 2021-06-11 RX ADMIN — ACETAMINOPHEN 650 MG: 325 TABLET ORAL at 02:23

## 2021-06-11 ASSESSMENT — PAIN - FUNCTIONAL ASSESSMENT: PAIN_FUNCTIONAL_ASSESSMENT: ACTIVITIES ARE NOT PREVENTED

## 2021-06-11 ASSESSMENT — PAIN DESCRIPTION - LOCATION: LOCATION: NECK

## 2021-06-11 ASSESSMENT — PAIN DESCRIPTION - PROGRESSION
CLINICAL_PROGRESSION: GRADUALLY IMPROVING

## 2021-06-11 ASSESSMENT — PAIN SCALES - GENERAL
PAINLEVEL_OUTOF10: 2
PAINLEVEL_OUTOF10: 0
PAINLEVEL_OUTOF10: 2
PAINLEVEL_OUTOF10: 0
PAINLEVEL_OUTOF10: 2
PAINLEVEL_OUTOF10: 0
PAINLEVEL_OUTOF10: 0

## 2021-06-11 ASSESSMENT — PAIN DESCRIPTION - PAIN TYPE: TYPE: SURGICAL PAIN;ACUTE PAIN

## 2021-06-11 ASSESSMENT — PAIN DESCRIPTION - ORIENTATION: ORIENTATION: LEFT

## 2021-06-11 ASSESSMENT — PAIN DESCRIPTION - DESCRIPTORS: DESCRIPTORS: SORE;DISCOMFORT

## 2021-06-11 ASSESSMENT — PAIN DESCRIPTION - FREQUENCY: FREQUENCY: CONTINUOUS

## 2021-06-11 ASSESSMENT — PAIN DESCRIPTION - ONSET: ONSET: ON-GOING

## 2021-06-11 NOTE — PROGRESS NOTES
Vascular Surgery Progress Note    Pt is being seen in f/u today regarding left carotid endarterectomy    Subjective  Pt s/e. No events overnight feels well tolerated clear liquids.  Denies difficulty swallowing    Current Medications:    sodium chloride        sodium chloride flush, sodium chloride, ondansetron **OR** ondansetron, oxyCODONE **OR** oxyCODONE    sodium chloride flush  5-40 mL Intravenous 2 times per day    enoxaparin  40 mg Subcutaneous Daily    acetaminophen  650 mg Oral Q6H    metoprolol tartrate  12.5 mg Oral BID    aspirin  81 mg Oral Daily    atorvastatin  40 mg Oral Nightly    clopidogrel  75 mg Oral Daily        PHYSICAL EXAM:    BP (!) 115/51   Pulse 55   Temp 97.6 °F (36.4 °C) (Temporal)   Resp 21   Ht 4' 11\" (1.499 m)   Wt 162 lb (73.5 kg)   SpO2 96%   Breastfeeding No   BMI 32.72 kg/m²     Intake/Output Summary (Last 24 hours) at 6/11/2021 5554  Last data filed at 6/11/2021 0700  Gross per 24 hour   Intake 2591 ml   Output 1055 ml   Net 1536 ml          Gen: awake, alert and oriented x3, no apparent distress  Cranial nerves grossly intact with no focal neurological deficit, no tongue deviation  Left-sided neck incision clean dry intact with glue  CVS: RRR  Resp: No increased work of breathing room air  Abd: Soft, non-tender, non-distended  Moves all extremities, palpable radial pulses bilaterally      LABS:    Lab Results   Component Value Date    WBC 15.8 (H) 06/11/2021    HGB 13.3 06/11/2021    HCT 39.1 06/11/2021     06/11/2021    PROTIME 12.4 06/07/2021    INR 1.1 06/07/2021    APTT 31.4 06/07/2021    K 3.8 06/11/2021    BUN 7 06/11/2021    CREATININE 0.5 06/11/2021       A/P  53 y.o. female s/p left carotid endarterectomy    Advance diet  Remove arterial catheter  Remove Toscano catheter  Ambulate patient  Statin and dapt has been added to patient's home meds at [facility] for dc from vascular po    Arlyn Paz PA-C

## 2021-06-11 NOTE — PLAN OF CARE
Problem: Falls - Risk of:  Goal: Will remain free from falls  Description: Will remain free from falls  6/11/2021 1036 by Yvonne Browne RN  Outcome: Met This Shift     Problem: Falls - Risk of:  Goal: Absence of physical injury  Description: Absence of physical injury  6/11/2021 1036 by Yvonne Browne RN  Outcome: Met This Shift     Problem: HEMODYNAMIC STATUS  Goal: Patient has stable vital signs and fluid balance  6/11/2021 1036 by Yvonne Browne RN  Outcome: Met This Shift     Problem: ACTIVITY INTOLERANCE/IMPAIRED MOBILITY  Goal: Mobility/activity is maintained at optimum level for patient  6/11/2021 1036 by Yvonne Browne RN  Outcome: Met This Shift     Problem: Pain:  Goal: Pain level will decrease  Description: Pain level will decrease  6/11/2021 1036 by Yvonne Browne RN  Outcome: Met This Shift     Problem: Pain:  Goal: Control of acute pain  Description: Control of acute pain  6/11/2021 1036 by Yvonne Browne RN  Outcome: Met This Shift

## 2021-06-11 NOTE — PROGRESS NOTES
Subjective:    Chief complaint:    Doing well after surgery  Able to swallow    Objective:    BP (!) 101/51   Pulse 55   Temp 97 °F (36.1 °C) (Temporal)   Resp 23   Ht 4' 11\" (1.499 m)   Wt 162 lb (73.5 kg)   SpO2 93%   Breastfeeding No   BMI 32.72 kg/m²   General : Awake ,alert,no distress. Heart:  RRR, no murmurs, gallops, or rubs. Lungs:  CTA bilaterally, no wheeze, rales or rhonchi  Abd: bowel sounds present, nontender, nondistended, no masses  Extrem:  No clubbing, cyanosis, or edema    CBC:   Lab Results   Component Value Date    WBC 15.8 06/11/2021    RBC 4.39 06/11/2021    HGB 13.3 06/11/2021    HCT 39.1 06/11/2021    MCV 89.1 06/11/2021    MCH 30.3 06/11/2021    MCHC 34.0 06/11/2021    RDW 12.0 06/11/2021     06/11/2021    MPV 11.3 06/11/2021     BMP:    Lab Results   Component Value Date     06/11/2021    K 3.8 06/11/2021     06/11/2021    CO2 22 06/11/2021    BUN 7 06/11/2021    LABALBU 4.3 06/07/2021    CREATININE 0.5 06/11/2021    CALCIUM 8.5 06/11/2021    GFRAA >60 06/11/2021    LABGLOM >60 06/11/2021    GLUCOSE 126 06/11/2021     PT/INR:    Lab Results   Component Value Date    PROTIME 12.4 06/07/2021    INR 1.1 06/07/2021     Troponin:  No results found for: TROPONINI    No results for input(s): LABURIN in the last 72 hours. No results for input(s): BC in the last 72 hours. No results for input(s): Milena Longest in the last 72 hours.       Current Facility-Administered Medications:     sodium chloride flush 0.9 % injection 5-40 mL, 5-40 mL, Intravenous, 2 times per day, Eddie Dorman MD, 10 mL at 06/11/21 0800    sodium chloride flush 0.9 % injection 5-40 mL, 5-40 mL, Intravenous, PRN, Eddie Dorman MD, 10 mL at 06/10/21 1605    0.9 % sodium chloride infusion, 25 mL, Intravenous, PRN, Eddie Dorman MD    enoxaparin (LOVENOX) injection 40 mg, 40 mg, Subcutaneous, Daily, Eddie Dorman MD    ondansetron (ZOFRAN-ODT) disintegrating tablet 4 mg, 4 mg, Oral, Q8H PRN **OR** ondansetron (ZOFRAN) injection 4 mg, 4 mg, Intravenous, Q6H PRN, Ary Bowen MD, 4 mg at 06/10/21 1501    acetaminophen (TYLENOL) tablet 650 mg, 650 mg, Oral, Q6H, Ary Bowen MD, 650 mg at 06/11/21 0759    oxyCODONE (ROXICODONE) immediate release tablet 5 mg, 5 mg, Oral, Q4H PRN **OR** oxyCODONE HCl (OXY-IR) immediate release tablet 10 mg, 10 mg, Oral, Q4H PRN, Ary Bowen MD    metoprolol tartrate (LOPRESSOR) tablet 12.5 mg, 12.5 mg, Oral, BID, Ary Bowen MD, 12.5 mg at 06/09/21 1815    aspirin chewable tablet 81 mg, 81 mg, Oral, Daily, Ary Bowen MD, 81 mg at 06/11/21 0759    atorvastatin (LIPITOR) tablet 40 mg, 40 mg, Oral, Nightly, Ary Bowen MD, 40 mg at 06/10/21 2148    clopidogrel (PLAVIX) tablet 75 mg, 75 mg, Oral, Daily, Ary Bowen MD, 75 mg at 06/11/21 0759    ADULT DIET; Regular; Low Fat/Low Chol/High Fiber/2 gm Na    NM Cardiac Stress Test Nuclear Imaging   Final Result      The myocardial perfusion imaging was normal.      Overall left ventricular systolic function was normal with no regional   wall motion abnormalities. Low risk pharmacologic myocardial perfusion imaging study. MRI BRAIN WO CONTRAST   Final Result   1. Small embolic infarctions in the left cerebral hemisphere, with the   largest focus in the left basal ganglia measuring approximately 9 mm. 2. No hemorrhage, mass effect or midline shift. CT HEAD WO CONTRAST   Final Result   Grossly stable lacunar infarctions involving the left basal ganglia. Correlation with brain MRI could be considered to determine chronicity.              Assessment:    Active Problems:    Acute cerebrovascular accident (CVA) ([address]    Acute ischemic stroke ([address]    Stenosis of left carotid artery    Cerebral infarction due to embolism of middle cerebral artery ([address]    Cerebrovascular accident (CVA) ([address]    Tobacco dependence    Peripheral vascular disease (HCC)    Tobacco abuse  Resolved Problems:    * No resolved hospital problems. *  Left internal carotid carotid artery stenosis  Tobacco abuse    Plan:    Okay for discharge once okay with vascular  Jamesphyllis Ryder MD  1:52 PM  6/11/2021    NOTE: This report was transcribed using voice recognition software.  Every effort was made to ensure accuracy; however, inadvertent transcription errors may be present

## 2021-06-11 NOTE — CARE COORDINATION
SOCIAL WORK/CASEMANAGEMENT TRANSITION OF CARE [id number] [place], [address], [place], [phone]): met with pt in the room for discharge today if ok with pcp. Pt wants [facility] to follow her at home when choices were given since her boyfriend and mother use them. I faxed the chart with orders and protocol to [facility] with a follow up call. They will see pt tomorrow. Also the rn was asking about a bp cuff. [facility] will provide one to pt with order. rn notified. IDA Gonzales  ,6/11/2021   The Plan for Transition of Care is related to the following treatment goals: hhc and dme     The Patient and/or patient representative  was provided with a choice of provider and agrees   with the discharge plan. [x] Yes [] No    Freedom of choice list was provided with basic dialogue that supports the patient's individualized plan of care/goals, treatment preferences and shares the quality data associated with the providers.  [x] Yes [] No

## 2021-06-11 NOTE — PROGRESS NOTES
CLINICAL PHARMACY NOTE: MEDS TO BEDS    Total # of Prescriptions Filled: 1   The following medications were delivered to the patient:  · Metoprolol tartrate 25 mg       Additional Documentation:

## 2021-06-11 NOTE — PROGRESS NOTES
Vascular Surgery Progress Note    Pt is being seen in f/u today regarding left carotid endarterectomy    Subjective  Pt s/e.   No events overnight feels well tolerated clear liquids    Current Medications:    sodium chloride      dextrose 5% and 0.45% NaCl with KCl 20 mEq 100 mL/hr at 06/11/21 0051    niCARdipene (CARDENE) 50 mg in 250 mL 0.9 % sodium chloride infusion        sodium chloride flush, sodium chloride, ondansetron **OR** ondansetron, oxyCODONE **OR** oxyCODONE, morphine **OR** morphine    sodium chloride flush  5-40 mL Intravenous 2 times per day    enoxaparin  40 mg Subcutaneous Daily    acetaminophen  650 mg Oral Q6H    metoprolol tartrate  12.5 mg Oral BID    aspirin  81 mg Oral Daily    atorvastatin  40 mg Oral Nightly    clopidogrel  75 mg Oral Daily        PHYSICAL EXAM:    BP (!) 115/51   Pulse 58   Temp 97.6 °F (36.4 °C) (Temporal)   Resp 23   Ht 4' 11\" (1.499 m)   Wt 162 lb (73.5 kg)   SpO2 92%   Breastfeeding No   BMI 32.72 kg/m²     Intake/Output Summary (Last 24 hours) at 6/11/2021 0543  Last data filed at 6/11/2021 0500  Gross per 24 hour   Intake 1760 ml   Output 995 ml   Net 765 ml          Gen: awake, alert and oriented x3, no apparent distress  Cranial nerves grossly intact with no focal neurological deficit  Left-sided neck incision clean dry intact with glue  CVS: RRR  Resp: No increased work of breathing room air  Abd: Soft, non-tender, non-distended  Moves all extremities, palpable radial pulses bilaterally      LABS:    Lab Results   Component Value Date    WBC 12.5 (H) 06/07/2021    HGB 16.4 (H) 06/07/2021    HCT 48.2 (H) 06/07/2021     06/07/2021    PROTIME 12.4 06/07/2021    INR 1.1 06/07/2021    APTT 31.4 06/07/2021    K 3.5 06/07/2021    BUN 8 06/07/2021    CREATININE 0.6 06/07/2021       A/P  53 y.o. female s/p left carotid endarterectomy    Advance diet  Remove arterial catheter  Remove Toscano catheter  Ambulate patient  Likely home today  Will discuss

## 2021-06-11 NOTE — PROGRESS NOTES
Kennedi Espino is a 53 y.o. right handed female     Neurology following for stroke    PMH of HTN, HLD, current everyday cigarette smoker     Presented to ED with episode of speech disturbance, expressive aphasia. She no other motor or sensory symptoms. CTH showed left lacunar infarct of unclear chronicity. CTA head/neck showed LICA 03% stenosed. Vascular was consulted and patient to consider left carotid intervention. MRI brain showed several small infarcts to left hemisphere likely from LICA stenosis. Patient underwent left CEA yesterday.     Patient sitting up in chair at bedside. Has complaints of left sided back pain. Reviewed MRI brain and showed images to patient. Addressed her questions and concerns. Discussed stroke risk factors as patient is a 2 pack/day cigarette smoker. Patient has 4 no focal deficits or complaints at this time.     No chest pain or palpitations  No coughing or wheezing    No vertigo, lightheadedness or loss of consciousness  No falls, tripping or stumbling  No incontinence of bowels or bladder  No itching or bruising appreciated  No numbness, tingling or focal arm/leg weakness    ROS otherwise negative      Objective:     /62   Pulse 66   Temp 97 °F (36.1 °C) (Temporal)   Resp 21   Ht 4' 11\" (1.499 m)   Wt 162 lb (73.5 kg)   SpO2 95%   Breastfeeding No   BMI 32.72 kg/m²     General appearance: alert, appears stated age, cooperative and no distress  Head: normocephalic, without obvious abnormality, atraumatic  Eyes: conjunctivae/corneas clear  Neck: supple, symmetrical, trachea midline, left CEA scar  Lungs: Respirations nonlabored  Heart: NSR monitor  Extremities:  normal, atraumatic, no cyanosis or edema  Skin: color, texture, turgor normal---no rashes or lesions      Mental Status: Alert, oriented, thought content appropriate     Appropriate attention/concentration  Intact fundus of knowledge  Repetition intact  Intact memories      Speech: No dysarthria  Language: No aphasias    Cranial Nerves:  I: smell NA   II: visual acuity  NA   II: visual fields Full to confrontation   II: pupils JESSICA   III,VII: ptosis None   III,IV,VI: extraocular muscles  EOMI without nystagmus   V: mastication Normal   V: facial light touch sensation  Normal   V,VII: corneal reflex     VII: facial muscle function - upper  Normal   VII: facial muscle function - lower Normal   VIII: hearing Normal   IX: soft palate elevation  Normal   IX,X: gag reflex    XI: trapezius strength  5/5   XI: sternocleidomastoid strength 5/5   XI: neck extension strength  5/5   XII: tongue strength  Normal     Motor:  5/5 throughout  Normal bulk and tone  No drift   No abnormal movements    Sensory:  LT and PP normal  No I would bring him for you  Coordination:   FN, FFM and BENJAMIN normal  HS normal    DTR:   2+ throughout    No Babinskis    Laboratory/Radiology:     CBC with Differential:    Lab Results   Component Value Date    WBC 15.8 06/11/2021    RBC 4.39 06/11/2021    HGB 13.3 06/11/2021    HCT 39.1 06/11/2021     06/11/2021    MCV 89.1 06/11/2021    MCH 30.3 06/11/2021    MCHC 34.0 06/11/2021    RDW 12.0 06/11/2021     CMP:    Lab Results   Component Value Date     06/11/2021    K 3.8 06/11/2021     06/11/2021    CO2 22 06/11/2021    BUN 7 06/11/2021    CREATININE 0.5 06/11/2021    GFRAA >60 06/11/2021    LABGLOM >60 06/11/2021    GLUCOSE 126 06/11/2021    PROT 7.8 06/07/2021    LABALBU 4.3 06/07/2021    CALCIUM 8.5 06/11/2021    BILITOT 0.3 06/07/2021    ALKPHOS 81 06/07/2021    AST 14 06/07/2021    ALT 17 06/07/2021     Hepatic Function Panel:    Lab Results   Component Value Date    ALKPHOS 81 06/07/2021    ALT 17 06/07/2021    AST 14 06/07/2021    PROT 7.8 06/07/2021    BILITOT 0.3 06/07/2021    BILIDIR 0.2 06/06/2021    IBILI 0.0 06/06/2021    LABALBU 4.3 06/07/2021     HgBA1c:    Lab Results   Component Value Date    LABA1C 5.3 06/09/2021     FLP:    Lab Results   Component Value Date    TRIG 144 06/09/2021    HDL 35 06/09/2021    LDLCALC 183 06/09/2021    LABVLDL 29 06/09/2021     CTH showed left lacunar infarct of unclear chronicity. CTA head/neck showed LICA 92% stenosed. MRI brain showed several small infarcts to left hemisphere likely from LICA stenosis.     All labs and imaging studies reviewed independently today    Assessment:     Left lacunar infarct with several small infarcts in left hemisphere likely due to stenosed LICA  --- Left CEA by vascular  ---  and A1c pending  --- stroke risk factors include: HTN, HLD, current everyday cigarette smoker    Plan:     Continue DAPT  Continue statin  PT/OT  SCDs  Stroke education  Smoking cessation education  Stroke clinic follow-up  Neurology to sign off      TODD Dumont CNP  11:03 AM  6/11/2021

## 2021-06-11 NOTE — PLAN OF CARE
Problem: Falls - Risk of:  Goal: Will remain free from falls  Outcome: Met This Shift  Goal: Absence of physical injury  Outcome: Met This Shift     Problem: HEMODYNAMIC STATUS  Goal: Patient has stable vital signs and fluid balance  Outcome: Met This Shift     Problem: ACTIVITY INTOLERANCE/IMPAIRED MOBILITY  Goal: Mobility/activity is maintained at optimum level for patient  Outcome: Met This Shift     Problem: Pain:  Goal: Pain level will decrease  Outcome: Met This Shift  Goal: Control of acute pain  Outcome: Met This Shift     Problem: COMMUNICATION IMPAIRMENT  Goal: Ability to express needs and understand communication  Outcome: Completed     Problem: Pain:  Goal: Control of chronic pain  Outcome: Completed     Problem: COMMUNICATION IMPAIRMENT  Goal: Ability to express needs and understand communication  Outcome: Completed     Problem: Pain:  Goal: Control of chronic pain  Outcome: Completed

## 2021-06-11 NOTE — PROGRESS NOTES
Vascular Surgery Progress Note    Pt is being seen in f/u today regarding L carotid endarterectomy 7/37 s/p embolic stroke in L cerebral hemisphere 6/9    Subjective  Pt s/e. Pt received 1 dose of prn Morphine injection for pain overnight. Still has some soreness in her neck. No noticeable FND by pt. Pt  has no residual slurred speech from recent stroke this morning. Nicardipine drip stopped yesterday. No CP, SOB, abd pain, n/v.     Current Medications:    sodium chloride      dextrose 5% and 0.45% NaCl with KCl 20 mEq 100 mL/hr at 06/11/21 0051    niCARdipene (CARDENE) 50 mg in 250 mL 0.9 % sodium chloride infusion            sodium chloride flush  5-40 mL Intravenous 2 times per day    enoxaparin  40 mg Subcutaneous Daily    acetaminophen  650 mg Oral Q6H    metoprolol tartrate  12.5 mg Oral BID    aspirin  81 mg Oral Daily    atorvastatin  40 mg Oral Nightly    clopidogrel  75 mg Oral Daily        PHYSICAL EXAM:    BP (!) 115/51   Pulse 58   Temp 97.6 °F (36.4 °C) (Temporal)   Resp 23   Ht 4' 11\" (1.499 m)   Wt 162 lb (73.5 kg)   SpO2 92%   Breastfeeding No   BMI 32.72 kg/m²     Intake/Output Summary (Last 24 hours) at 6/11/2021 0543  Last data filed at 6/11/2021 0500  Gross per 24 hour   Intake 1760 ml   Output 995 ml   Net 765 ml          Gen: awake, alert and oriented x3, no apparent distress  HEENT: Left neck incision intact, mild erythema along incision site, no pus or drainage  CVS: RRR  Resp: No increased work of breathing  Abd: Soft, non-tender, non-distended, Toscano draining clear urine, non-bloody, non-cloudy  Ext: Acyanotic, Atraumatic  Neuro: Motor 5/5 BUE & BLE. Sensation symmetric in all extremities    Cranial Nerves  II. Visual fields full to confrontation bilaterally. III, IV, VI: Pupils equally round and reactive to light, 3 to 2 mm bilaterally. EOMs: full, no nystagmus.    V. Facial sensation intact to light touch bilaterally  VII: Facial movements symmetric and strong  VIII:

## 2021-06-13 NOTE — DISCHARGE SUMMARY
Physician Discharge Summary     Patient ID:  Gloria Rincon  [id number]  53 y.o.  1970    Admit date: 6/8/2021    Discharge date and time: 6/11/2021  1:45 PM     Admission Diagnoses: Active Problems:    Acute cerebrovascular accident (CVA) ([address]    Acute ischemic stroke ([address]    Stenosis of left carotid artery    Cerebral infarction due to embolism of middle cerebral artery ([address]    Cerebrovascular accident (CVA) ([address]    Tobacco dependence    Peripheral vascular disease ([address]    Tobacco abuse  Resolved Problems:    * No resolved hospital problems. *      Discharge Diagnoses: Active Problems:    Acute cerebrovascular accident (CVA) ([address]    Acute ischemic stroke ([address]    Stenosis of left carotid artery    Cerebral infarction due to embolism of middle cerebral artery ([address]    Cerebrovascular accident (CVA) ([address]    Tobacco dependence    Peripheral vascular disease ([address]    Tobacco abuse  Resolved Problems:    * No resolved hospital problems. *      Condition at discharge : Stable    Consults: IP CONSULT TO NEUROLOGY  IP CONSULT TO VASCULAR SURGERY  IP CONSULT TO CARDIOLOGY    Procedures: Left carotid endarterectomy    Hospital Course: 43-year-old lady presented initially to the emergency room with left-sided facial droop. She was recommended admission. She had to go back home to care for her mother. She then presented to the emergency room again. She was then admitted. Neurology was consulted. She does have an underlying history of family history of premature strokes as well as underlying history of tobacco abuse. She was confirmed to have a stroke. She also had severe stenosis of left internal carotid artery. She then underwent carotid endarterectomy. She did well with this and was discharged home.     NM Cardiac Stress Test Nuclear Imaging   Final Result      The myocardial perfusion imaging was normal.      Overall left ventricular systolic function was normal with no regional   wall motion abnormalities. Low risk pharmacologic myocardial perfusion imaging study. MRI BRAIN WO CONTRAST   Final Result   1. Small embolic infarctions in the left cerebral hemisphere, with the   largest focus in the left basal ganglia measuring approximately 9 mm. 2. No hemorrhage, mass effect or midline shift. CT HEAD WO CONTRAST   Final Result   Grossly stable lacunar infarctions involving the left basal ganglia. Correlation with brain MRI could be considered to determine chronicity.              Results for orders placed or performed during the hospital encounter of 06/08/21 (from the past 336 hour(s))   EKG 12 Lead    Collection Time: 06/07/21 10:33 PM   Result Value Ref Range    Ventricular Rate 103 BPM    Atrial Rate 103 BPM    P-R Interval 158 ms    QRS Duration 72 ms    Q-T Interval 338 ms    QTc Calculation (Bazett) 442 ms    P Axis 65 degrees    R Axis 47 degrees    T Axis 45 degrees   CBC    Collection Time: 06/07/21 10:36 PM   Result Value Ref Range    WBC 12.5 (H) 4.5 - 11.5 E9/L    RBC 5.33 3.50 - 5.50 E12/L    Hemoglobin 16.4 (H) 11.5 - 15.5 g/dL    Hematocrit 48.2 (H) 34.0 - 48.0 %    MCV 90.4 80.0 - 99.9 fL    MCH 30.8 26.0 - 35.0 pg    MCHC 34.0 32.0 - 34.5 %    RDW 12.2 11.5 - 15.0 fL    Platelets 185 864 - 822 E9/L    MPV 11.1 7.0 - 12.0 fL   Comprehensive Metabolic Panel    Collection Time: 06/07/21 10:36 PM   Result Value Ref Range    Sodium 138 132 - 146 mmol/L    Potassium 3.5 3.5 - 5.0 mmol/L    Chloride 101 98 - 107 mmol/L    CO2 25 22 - 29 mmol/L    Anion Gap 12 7 - 16 mmol/L    Glucose 188 (H) 74 - 99 mg/dL    BUN 8 6 - 20 mg/dL    CREATININE 0.6 0.5 - 1.0 mg/dL    GFR Non-African American >60 >=60 mL/min/1.73    GFR African American >60     Calcium 9.7 8.6 - 10.2 mg/dL    Total Protein 7.8 6.4 - 8.3 g/dL    Albumin 4.3 3.5 - 5.2 g/dL    Total Bilirubin 0.3 0.0 - 1.2 mg/dL    Alkaline Phosphatase 81 35 - 104 U/L    ALT 17 0 - 32 U/L    AST 14 0 - 31 U/L   Troponin Collection Time: 06/07/21 10:36 PM   Result Value Ref Range    Troponin, High Sensitivity <6 0 - 9 ng/L   APTT    Collection Time: 06/07/21 10:36 PM   Result Value Ref Range    aPTT 31.4 24.5 - 35.1 sec   Protime-INR    Collection Time: 06/07/21 10:36 PM   Result Value Ref Range    Protime 12.4 9.3 - 12.4 sec    INR 1.1    TYPE AND SCREEN    Collection Time: 06/07/21 10:36 PM   Result Value Ref Range    ABO/Rh A NEG     Antibody Screen NEG    HCG, SERUM, QUALITATIVE    Collection Time: 06/08/21  2:22 AM   Result Value Ref Range    hCG Qual NEGATIVE NEGATIVE   Lipid panel    Collection Time: 06/09/21  9:13 AM   Result Value Ref Range    Cholesterol, Total 247 (H) 0 - 199 mg/dL    Triglycerides 144 0 - 149 mg/dL    HDL 35 >40 mg/dL    LDL Calculated 183 (H) 0 - 99 mg/dL    VLDL Cholesterol Calculated 29 mg/dL   Hemoglobin A1c    Collection Time: 06/09/21  9:13 AM   Result Value Ref Range    Hemoglobin A1C 5.3 4.0 - 5.6 %   POC ACT-Low Range    Collection Time: 06/10/21 11:45 AM   Result Value Ref Range    POC ACT  Not established seconds   POC ACT-Low Range    Collection Time: 06/10/21 12:01 PM   Result Value Ref Range    POC ACT  Not established seconds   POC ACT-Low Range    Collection Time: 06/10/21 12:51 PM   Result Value Ref Range    POC ACT  Not established seconds   Basic Metabolic Panel    Collection Time: 06/11/21  5:20 AM   Result Value Ref Range    Sodium 135 132 - 146 mmol/L    Potassium 3.8 3.5 - 5.0 mmol/L    Chloride 105 98 - 107 mmol/L    CO2 22 22 - 29 mmol/L    Anion Gap 8 7 - 16 mmol/L    Glucose 126 (H) 74 - 99 mg/dL    BUN 7 6 - 20 mg/dL    CREATININE 0.5 0.5 - 1.0 mg/dL    GFR Non-African American >60 >=60 mL/min/1.73    GFR African American >60     Calcium 8.5 (L) 8.6 - 10.2 mg/dL   CBC    Collection Time: 06/11/21  5:20 AM   Result Value Ref Range    WBC 15.8 (H) 4.5 - 11.5 E9/L    RBC 4.39 3.50 - 5.50 E12/L    Hemoglobin 13.3 11.5 - 15.5 g/dL    Hematocrit 39.1 34.0 - 48.0 % MCV 89.1 80.0 - 99.9 fL    MCH 30.3 26.0 - 35.0 pg    MCHC 34.0 32.0 - 34.5 %    RDW 12.0 11.5 - 15.0 fL    Platelets 313 442 - 371 E9/L    MPV 11.3 7.0 - 12.0 fL   Results for orders placed or performed during the hospital encounter of 06/06/21 (from the past 336 hour(s))   POCT Glucose    Collection Time: 06/06/21  6:45 PM   Result Value Ref Range    Meter Glucose 110 (H) 74 - 99 mg/dL   EKG 12 Lead    Collection Time: 06/06/21  6:53 PM   Result Value Ref Range    Ventricular Rate 96 BPM    Atrial Rate 96 BPM    P-R Interval 156 ms    QRS Duration 70 ms    Q-T Interval 350 ms    QTc Calculation (Bazett) 442 ms    P Axis 30 degrees    R Axis 7 degrees    T Axis 32 degrees   Troponin    Collection Time: 06/06/21  7:45 PM   Result Value Ref Range    Troponin, High Sensitivity <6 0 - 9 ng/L   CBC    Collection Time: 06/06/21  7:45 PM   Result Value Ref Range    WBC 12.1 (H) 4.5 - 11.5 E9/L    RBC 5.36 3.50 - 5.50 E12/L    Hemoglobin 16.7 (H) 11.5 - 15.5 g/dL    Hematocrit 48.1 (H) 34.0 - 48.0 %    MCV 89.7 80.0 - 99.9 fL    MCH 31.2 26.0 - 35.0 pg    MCHC 34.7 (H) 32.0 - 34.5 %    RDW 12.2 11.5 - 15.0 fL    Platelets 393 384 - 901 E9/L    MPV 10.7 7.0 - 12.0 fL   Basic Metabolic Panel    Collection Time: 06/06/21  7:45 PM   Result Value Ref Range    Sodium 140 132 - 146 mmol/L    Potassium 3.8 3.5 - 5.0 mmol/L    Chloride 105 98 - 107 mmol/L    CO2 23 22 - 29 mmol/L    Anion Gap 12 7 - 16 mmol/L    Glucose 104 (H) 74 - 99 mg/dL    BUN 11 6 - 20 mg/dL    CREATININE 0.6 0.5 - 1.0 mg/dL    GFR Non-African American >60 >=60 mL/min/1.73    GFR African American >60     Calcium 9.3 8.6 - 10.2 mg/dL   CK MB    Collection Time: 06/06/21  7:45 PM   Result Value Ref Range    CK-MB 1.0 0.0 - 4.3 ng/mL   CK    Collection Time: 06/06/21  7:45 PM   Result Value Ref Range    Total CK 70 20 - 180 U/L   Hepatic Function Panel    Collection Time: 06/06/21  7:45 PM   Result Value Ref Range    Total Protein 8.0 6.4 - 8.3 g/dL Albumin 4.3 3.5 - 5.2 g/dL    Alkaline Phosphatase 82 35 - 104 U/L    ALT 16 0 - 32 U/L    AST 14 0 - 31 U/L    Total Bilirubin 0.2 0.0 - 1.2 mg/dL    Bilirubin, Direct 0.2 0.0 - 0.3 mg/dL    Bilirubin, Indirect 0.0 0.0 - 1.0 mg/dL   Lipase    Collection Time: 06/06/21  7:45 PM   Result Value Ref Range    Lipase 16 13 - 60 U/L   Amylase    Collection Time: 06/06/21  7:45 PM   Result Value Ref Range    Amylase 53 20 - 100 U/L   Magnesium    Collection Time: 06/06/21  7:45 PM   Result Value Ref Range    Magnesium 1.9 1.6 - 2.6 mg/dL   Brain Natriuretic Peptide    Collection Time: 06/06/21  7:45 PM   Result Value Ref Range    Pro- (H) 0 - 125 pg/mL   Protime-INR    Collection Time: 06/06/21  7:45 PM   Result Value Ref Range    Protime >120.0 (H) 9.3 - 12.4 sec    INR >10.0 (HH)    Serum Drug Screen    Collection Time: 06/06/21  7:45 PM   Result Value Ref Range    Ethanol Lvl <10 mg/dL    Acetaminophen Level <5.0 (L) 10.0 - 40.7 mcg/mL    Salicylate, Serum <4.8 0.0 - 30.0 mg/dL    TCA Scrn NEGATIVE Cutoff:300 ng/mL   URINE DRUG SCREEN    Collection Time: 06/06/21  9:41 PM   Result Value Ref Range    Amphetamine Screen, Urine NOT DETECTED Negative <1000 ng/mL    Barbiturate Screen, Ur NOT DETECTED Negative < 200 ng/mL    Benzodiazepine Screen, Urine NOT DETECTED Negative < 200 ng/mL    Cannabinoid Scrn, Ur POSITIVE (A) Negative < 50ng/mL    Cocaine Metabolite Screen, Urine NOT DETECTED Negative < 300 ng/mL    Opiate Scrn, Ur NOT DETECTED Negative < 300ng/mL    PCP Screen, Urine NOT DETECTED Negative < 25 ng/mL    Methadone Screen, Urine NOT DETECTED Negative <300 ng/mL    Oxycodone Urine NOT DETECTED Negative <100 ng/mL    FENTANYL SCREEN, URINE NOT DETECTED Negative <1 ng/mL    Drug Screen Comment: see below          Discharge Exam:  See progress note from today    Disposition: Home    Patient Instructions:   Discharge Medication List as of 6/11/2021  1:00 PM      START taking these medications    Details atorvastatin (LIPITOR) 40 MG tablet Take 1 tablet by mouth daily, Disp-30 tablet, R-3Print      aspirin EC 81 MG EC tablet Take 1 tablet by mouth daily, Disp-30 tablet, R-3Print      clopidogrel (PLAVIX) 75 MG tablet Take 1 tablet by mouth daily, Disp-30 tablet, R-3Print      oxyCODONE (ROXICODONE) 5 MG immediate release tablet Take 1 tablet by mouth every 8 hours as needed for Pain for up to 3 days. , Disp-10 tablet, R-0Print      metoprolol tartrate (LOPRESSOR) 25 MG tablet Take 0.5 tablets by mouth 2 times daily, Disp-60 tablet, R-3Normal         STOP taking these medications       ibuprofen (ADVIL;MOTRIN) 600 MG tablet Comments:   Reason for Stopping:               Activity: As tolerated    Diet: Cardiac    Follow-up with Liana Davis MD  [facility] [address] [place] [address].   [place] [address]  [phone]    Schedule an appointment as soon as possible for a visit in 2 weeks  For wound re-check    Kiara Tamez MD  [address] [place] [address]  [phone]    Schedule an appointment as soon as possible for a visit in 2 weeks  Routine follow up after discharge from Hospital        Note that over 30 minutes was spent in preparing discharge papers, discussing discharge with patient, medication review, etc.    Signed:  Jeannine Diamond MD  6/12/2021  10:29 PM

## 2021-06-29 ENCOUNTER — TELEPHONE (OUTPATIENT)
Dept: CARDIOTHORACIC SURGERY | Age: 51
End: 2021-06-29

## 2021-06-29 NOTE — TELEPHONE ENCOUNTER
Called to confirm appointment for 6/30/21 at 8 a.m,left message with date, time, and phone number for patient.

## 2021-06-30 ENCOUNTER — OFFICE VISIT (OUTPATIENT)
Dept: VASCULAR SURGERY | Age: 51
End: 2021-06-30

## 2021-06-30 VITALS
HEIGHT: 59 IN | WEIGHT: 148 LBS | BODY MASS INDEX: 29.84 KG/M2 | SYSTOLIC BLOOD PRESSURE: 110 MMHG | DIASTOLIC BLOOD PRESSURE: 70 MMHG

## 2021-06-30 DIAGNOSIS — I65.22 STENOSIS OF LEFT CAROTID ARTERY: Primary | ICD-10-CM

## 2021-06-30 PROCEDURE — 99024 POSTOP FOLLOW-UP VISIT: CPT | Performed by: SURGERY

## 2021-06-30 NOTE — PROGRESS NOTES
Vascular Surgery Outpatient Progress Note      Chief Complaint   Patient presents with    Post-Op Check     s/p CEA       HISTORY OF PRESENT ILLNESS:                The patient is a 53 y.o. female who returns for follow-up evaluation of status post left CEA. She denies any new right-sided left-sided weakness numbness or vision changes. She denies any chest pain shortness of breath or discomfort. She told me that she is still refraining from tobacco.    Past Medical History:        Diagnosis Date    Cerebral infarction due to embolism of middle cerebral artery ([facility] 6/9/2021    Hyperlipidemia     Left carotid stenosis     Stroke (cerebrum) [facility]     Tobacco dependence 6/9/2021     Past Surgical History:        Procedure Laterality Date    CAROTID ENDARTERECTOMY Left 6/10/2021    CAROTID ENDARTERECTOMY performed by Nitza Barrera MD at [facility]      nodule removed     Current Medications:   Prior to Admission medications    Medication Sig Start Date End Date Taking?  Authorizing Provider   atorvastatin (LIPITOR) 40 MG tablet Take 1 tablet by mouth daily 6/11/21  Yes TODD Espinal CNP   aspirin EC 81 MG EC tablet Take 1 tablet by mouth daily 6/11/21  Yes TODD Espinal CNP   clopidogrel (PLAVIX) 75 MG tablet Take 1 tablet by mouth daily 6/11/21  Yes TODD Espinal CNP   metoprolol tartrate (LOPRESSOR) 25 MG tablet Take 0.5 tablets by mouth 2 times daily 6/11/21  Yes Laurita Pina MD     Allergies:  Pcn [penicillins]    Social History     Socioeconomic History    Marital status: Single     Spouse name: Not on file    Number of children: Not on file    Years of education: Not on file    Highest education level: Not on file   Occupational History    Not on file   Tobacco Use    Smoking status: Current Every Day Smoker     Packs/day: 1.50     Types: Cigarettes    Smokeless tobacco: Never Used   Substance and Sexual Activity    Alcohol use: No    and speech normal  Extremities: Right lower extremities demonstrate motor and sensation. There is no gross vascular deficit. Problem List Items Addressed This Visit     Stenosis of left carotid artery - Primary          Status post left carotid endarterectomy secondary to symptomatic carotid artery disease. At this point she is doing well she will follow-up in 6 months with a carotid duplex. I discussed with her the natural history of carotid disease and lifestyle modification. All questions were answered according to her satisfaction      No follow-ups on file.

## 2021-10-20 ENCOUNTER — HOSPITAL ENCOUNTER (OUTPATIENT)
Dept: INFUSION THERAPY | Age: 51
Setting detail: INFUSION SERIES
Discharge: HOME | End: 2021-10-20
Payer: COMMERCIAL

## 2021-10-20 VITALS
SYSTOLIC BLOOD PRESSURE: 127 MMHG | HEART RATE: 76 BPM | TEMPERATURE: 97.4 F | OXYGEN SATURATION: 99 % | RESPIRATION RATE: 12 BRPM | DIASTOLIC BLOOD PRESSURE: 69 MMHG

## 2021-10-20 DIAGNOSIS — U07.1 COVID: Primary | ICD-10-CM

## 2021-10-20 PROCEDURE — 2500000003 HC RX 250 WO HCPCS: Performed by: INTERNAL MEDICINE

## 2021-10-20 PROCEDURE — 96365 THER/PROPH/DIAG IV INF INIT: CPT

## 2021-10-20 PROCEDURE — 2580000003 HC RX 258: Performed by: INTERNAL MEDICINE

## 2021-10-20 PROCEDURE — 6360000002 HC RX W HCPCS: Performed by: INTERNAL MEDICINE

## 2021-10-20 RX ORDER — SODIUM CHLORIDE 9 MG/ML
INJECTION, SOLUTION INTRAVENOUS CONTINUOUS
Status: CANCELLED | OUTPATIENT
Start: 2021-10-20

## 2021-10-20 RX ORDER — METHYLPREDNISOLONE SODIUM SUCCINATE 125 MG/2ML
125 INJECTION, POWDER, LYOPHILIZED, FOR SOLUTION INTRAMUSCULAR; INTRAVENOUS ONCE
Status: CANCELLED | OUTPATIENT
Start: 2021-10-20 | End: 2021-10-20

## 2021-10-20 RX ORDER — HEPARIN SODIUM (PORCINE) LOCK FLUSH IV SOLN 100 UNIT/ML 100 UNIT/ML
500 SOLUTION INTRAVENOUS PRN
Status: DISCONTINUED | OUTPATIENT
Start: 2021-10-20 | End: 2021-10-21 | Stop reason: HOSPADM

## 2021-10-20 RX ORDER — SODIUM CHLORIDE 0.9 % (FLUSH) 0.9 %
5-40 SYRINGE (ML) INJECTION PRN
Status: CANCELLED | OUTPATIENT
Start: 2021-10-20

## 2021-10-20 RX ORDER — HEPARIN SODIUM (PORCINE) LOCK FLUSH IV SOLN 100 UNIT/ML 100 UNIT/ML
500 SOLUTION INTRAVENOUS PRN
Status: CANCELLED | OUTPATIENT
Start: 2021-10-20

## 2021-10-20 RX ORDER — SODIUM CHLORIDE 0.9 % (FLUSH) 0.9 %
5-40 SYRINGE (ML) INJECTION PRN
Status: DISCONTINUED | OUTPATIENT
Start: 2021-10-20 | End: 2021-10-21 | Stop reason: HOSPADM

## 2021-10-20 RX ORDER — EPINEPHRINE 1 MG/ML
0.3 INJECTION, SOLUTION, CONCENTRATE INTRAVENOUS PRN
Status: CANCELLED | OUTPATIENT
Start: 2021-10-20

## 2021-10-20 RX ORDER — SODIUM CHLORIDE 9 MG/ML
25 INJECTION, SOLUTION INTRAVENOUS PRN
Status: DISCONTINUED | OUTPATIENT
Start: 2021-10-20 | End: 2021-10-21 | Stop reason: HOSPADM

## 2021-10-20 RX ORDER — DIPHENHYDRAMINE HYDROCHLORIDE 50 MG/ML
50 INJECTION INTRAMUSCULAR; INTRAVENOUS ONCE
Status: CANCELLED | OUTPATIENT
Start: 2021-10-20 | End: 2021-10-20

## 2021-10-20 RX ORDER — SODIUM CHLORIDE 9 MG/ML
25 INJECTION, SOLUTION INTRAVENOUS PRN
Status: CANCELLED | OUTPATIENT
Start: 2021-10-20

## 2021-10-20 RX ADMIN — SODIUM CHLORIDE, PRESERVATIVE FREE 10 ML: 5 INJECTION INTRAVENOUS at 12:06

## 2021-10-20 RX ADMIN — SODIUM CHLORIDE 25 ML: 9 INJECTION, SOLUTION INTRAVENOUS at 12:47

## 2021-10-20 RX ADMIN — IMDEVIMAB: 1332 INJECTION, SOLUTION, CONCENTRATE INTRAVENOUS at 12:05

## 2021-12-08 DIAGNOSIS — I65.23 BILATERAL CAROTID ARTERY STENOSIS: Primary | ICD-10-CM

## 2022-01-05 ENCOUNTER — HOSPITAL ENCOUNTER (OUTPATIENT)
Dept: ULTRASOUND IMAGING | Age: 52
Discharge: HOME | End: 2022-01-07
Payer: COMMERCIAL

## 2022-01-05 DIAGNOSIS — I65.23 BILATERAL CAROTID ARTERY STENOSIS: ICD-10-CM

## 2022-01-05 PROCEDURE — 93880 EXTRACRANIAL BILAT STUDY: CPT | Performed by: RADIOLOGY

## 2022-01-05 PROCEDURE — 93880 EXTRACRANIAL BILAT STUDY: CPT

## 2022-01-25 ENCOUNTER — TELEPHONE (OUTPATIENT)
Dept: VASCULAR SURGERY | Age: 52
End: 2022-01-25

## 2022-01-25 NOTE — TELEPHONE ENCOUNTER
Called to confirm appointment for 1/26/22 at 945 a.m, left message with date, time, and phone number for patient.

## 2022-01-26 ENCOUNTER — OFFICE VISIT (OUTPATIENT)
Dept: VASCULAR SURGERY | Age: 52
End: 2022-01-26
Payer: COMMERCIAL

## 2022-01-26 VITALS
SYSTOLIC BLOOD PRESSURE: 120 MMHG | BODY MASS INDEX: 34.07 KG/M2 | DIASTOLIC BLOOD PRESSURE: 80 MMHG | HEIGHT: 59 IN | WEIGHT: 169 LBS

## 2022-01-26 DIAGNOSIS — I65.22 STENOSIS OF LEFT CAROTID ARTERY: Primary | ICD-10-CM

## 2022-01-26 PROCEDURE — G8484 FLU IMMUNIZE NO ADMIN: HCPCS | Performed by: SURGERY

## 2022-01-26 PROCEDURE — 99213 OFFICE O/P EST LOW 20 MIN: CPT | Performed by: SURGERY

## 2022-01-26 PROCEDURE — G8417 CALC BMI ABV UP PARAM F/U: HCPCS | Performed by: SURGERY

## 2022-01-26 PROCEDURE — G8427 DOCREV CUR MEDS BY ELIG CLIN: HCPCS | Performed by: SURGERY

## 2022-01-26 PROCEDURE — 3017F COLORECTAL CA SCREEN DOC REV: CPT | Performed by: SURGERY

## 2022-01-26 PROCEDURE — 1036F TOBACCO NON-USER: CPT | Performed by: SURGERY

## 2022-01-26 NOTE — PROGRESS NOTES
2022    Barbara PEREZ Rose  1970    Chief Complaint   Patient presents with    Circulatory Problem     stenosis of left carotid artery       Patient returns for post operative evaluation status post left carotid endarterectomy. The patient denies any new complaints. Denies smoking since her procedure on 6/10/21. Procedure Laterality Date    CAROTID ENDARTERECTOMY Left 6/10/2021    CAROTID ENDARTERECTOMY performed by Marilou Schultz MD at [facility]      nodule removed       Physical Exam:  GENERAL:  NAD. A&Ox3. HEAD:  Normocephalic. Atraumatic. Neck incision is healed with hypertrophic scar and no evidence of infection. EYES:   No scleral icterus. PERRL. LUNGS:  No increased work of breathing. CARDIOVASCULAR: RR, No carotid bruit   ABDOMEN:  Soft, non-distended, non-tender. No guarding, rigidity, rebound. EXTREMITIES:  Radial pulses b/l  SKIN:  Warm and dry    Patient MRN:  [id number]   : 1970   Age: 49 years   Gender: Female   Order Date:  2022 11:06 AM   EXAM: US CAROTID ARTERY BILATERAL   NUMBER OF IMAGES:  44   INDICATION: I65.23 Bilateral carotid artery stenosis    carotid artery stenosis   What reading provider will be dictating this exam?->[facility]   COMPARISON: None       FINDINGS:   Velocities are mildly elevated on the left   ICA\CCA ratios are  within normal limits   The right vertebral artery doppler images   demonstrate antegrade   flow. The left vertebral artery doppler images  demonstrate antegrade flow. Grey scale images demonstrate mild plaque identified in the right and   left carotid arteries.               Impression   Atherosclerotic disease. No hemodynamically significant stenosis is   identified   Estimated stenosis by NASCET criteria in the proximal right carotid   artery is between 0% and 49%.    Estimated stenosis by NASCET criteria in the proximal left carotid   artery is between 0% and 49%.             Assessment:  Post-operative carotid endarterectomy. 6/21    Problem List Items Addressed This Visit     Stenosis of left carotid artery - Primary          I reviewed with the patient that normal activities can be resumed as tolerated. Plan:   US reviewed 0-49% stenosis in b/l carotids. Return in 6 months for follow-up  Okay to stop Plavix  Continue ASA/Statin  Discussed with Dr. Terri Alonzo      Seen and examined as above. Overall she is doing well. No current issues. Ultrasounds were reviewed    She otherwise is doing well. And we will see her back in 6 months she will call if is any questions or concerns or issues she will maintain breast reduction therapy.     Terri Alonzo

## 2022-05-20 ENCOUNTER — TELEPHONE (OUTPATIENT)
Dept: VASCULAR SURGERY | Age: 52
End: 2022-05-20

## 2022-05-20 NOTE — TELEPHONE ENCOUNTER
Spoke to pt on her Carotid testing that is due prior to appt on 7- with Dr Joseph Frye. Scheduled her for here in our office on 7- at 8:15.

## 2022-07-11 DIAGNOSIS — I65.23 BILATERAL CAROTID ARTERY STENOSIS: Primary | ICD-10-CM

## 2022-07-22 ENCOUNTER — HOSPITAL ENCOUNTER (OUTPATIENT)
Dept: CARDIOLOGY | Age: 52
Discharge: HOME | End: 2022-07-22
Payer: COMMERCIAL

## 2022-07-22 DIAGNOSIS — I65.23 BILATERAL CAROTID ARTERY STENOSIS: ICD-10-CM

## 2022-07-22 PROCEDURE — 93880 EXTRACRANIAL BILAT STUDY: CPT

## 2022-07-22 NOTE — PROGRESS NOTES
[facility] - [facility]    This is a pre read worksheet - prior to official physician interpretation    Agueda Decker Rylee  1970  Date of study: 7/22/22    Indication for study:  Carotid artery stenosis  Study : Bilateral Carotid Artery Duplex Examination    Duplex examination of the RIGHT carotid artery system identifies atherosclerotic plaque. The peak systolic velocity in internal carotid artery was 106 centimeters / second. The maximum end diastolic velocity was 37 centimeters / second. The ICA/CCA ratio is 1.0. The right vertebral artery has antegrade flow. Duplex examination of the LEFT carotid artery system identifies atherosclerotic plaque. The peak systolic velocity in internal carotid artery was 154 centimeters / second. The maximum end diastolic velocity was 49 centimeters / second. The ICA/CCA ratio is 1.1. The left vertebral artery has antegrade flow.         LAST STUDY  1/5/2022 @ [facility]  Rt 0-49  Lt 0-49

## 2022-07-26 ENCOUNTER — TELEPHONE (OUTPATIENT)
Dept: VASCULAR SURGERY | Age: 52
End: 2022-07-26

## 2022-07-27 ENCOUNTER — OFFICE VISIT (OUTPATIENT)
Dept: VASCULAR SURGERY | Age: 52
End: 2022-07-27
Payer: COMMERCIAL

## 2022-07-27 VITALS — DIASTOLIC BLOOD PRESSURE: 76 MMHG | SYSTOLIC BLOOD PRESSURE: 112 MMHG

## 2022-07-27 DIAGNOSIS — I65.23 BILATERAL CAROTID ARTERY STENOSIS: Primary | ICD-10-CM

## 2022-07-27 PROCEDURE — 1036F TOBACCO NON-USER: CPT | Performed by: SURGERY

## 2022-07-27 PROCEDURE — 99213 OFFICE O/P EST LOW 20 MIN: CPT | Performed by: SURGERY

## 2022-07-27 PROCEDURE — G8417 CALC BMI ABV UP PARAM F/U: HCPCS | Performed by: SURGERY

## 2022-07-27 PROCEDURE — G8427 DOCREV CUR MEDS BY ELIG CLIN: HCPCS | Performed by: SURGERY

## 2022-07-27 PROCEDURE — 3017F COLORECTAL CA SCREEN DOC REV: CPT | Performed by: SURGERY

## 2022-07-27 RX ORDER — FUROSEMIDE 20 MG/1
20 TABLET ORAL DAILY
COMMUNITY
Start: 2022-06-22

## 2022-07-27 NOTE — PROGRESS NOTES
carotid   artery is between 0% and 49%. Estimated stenosis by NASCET criteria in the proximal left carotid   artery is between 0% and 49%. Barbara PEREZ Rose  1970  Date of study: 7/22/22     Indication for study:  Carotid artery stenosis  Study : Bilateral Carotid Artery Duplex Examination     Duplex examination of the RIGHT carotid artery system identifies atherosclerotic plaque. The peak systolic velocity in internal carotid artery was 106 centimeters / second. The maximum end diastolic velocity was 37 centimeters / second. The ICA/CCA ratio is 1.0. The right vertebral artery has antegrade flow. Duplex examination of the LEFT carotid artery system identifies atherosclerotic plaque. The peak systolic velocity in internal carotid artery was 154 centimeters / second. The maximum end diastolic velocity was 49 centimeters / second. The ICA/CCA ratio is 1.1. The left vertebral artery has antegrade flow. LAST STUDY  1/5/2022 @ [facility]  Rt 0-49  Lt 0-49       Assessment:  Post-operative carotid endarterectomy. 6/21    Problem List Items Addressed This Visit    None    #1 bilateral carotid artery disease status post carotid endarterectomy. The left appears to be widely patent. The velocities are slightly elevated. Right now she is asymptomatic and otherwise doing well. We will repeat the ultrasound examination in 6 months she will call if is any questions or concerns. We went over risk reduction therapy.     Merari Rosa

## 2022-11-27 ENCOUNTER — HOSPITAL ENCOUNTER (EMERGENCY)
Age: 52
Discharge: HOME | End: 2022-11-27
Attending: EMERGENCY MEDICINE
Payer: COMMERCIAL

## 2022-11-27 VITALS
TEMPERATURE: 98.2 F | OXYGEN SATURATION: 96 % | BODY MASS INDEX: 33.73 KG/M2 | SYSTOLIC BLOOD PRESSURE: 175 MMHG | DIASTOLIC BLOOD PRESSURE: 83 MMHG | RESPIRATION RATE: 16 BRPM | WEIGHT: 167 LBS | HEART RATE: 97 BPM

## 2022-11-27 DIAGNOSIS — N30.90 CYSTITIS: Primary | ICD-10-CM

## 2022-11-27 LAB
BACTERIA: ABNORMAL /HPF
BILIRUBIN URINE: NEGATIVE
BLOOD, URINE: ABNORMAL
CLARITY: ABNORMAL
COLOR: YELLOW
EPITHELIAL CELLS, UA: ABNORMAL /HPF
GLUCOSE URINE: 500 MG/DL
KETONES, URINE: NEGATIVE MG/DL
LEUKOCYTE ESTERASE, URINE: NEGATIVE
NITRITE, URINE: POSITIVE
PH UA: 5.5 (ref 5–9)
PROTEIN UA: NEGATIVE MG/DL
RBC UA: ABNORMAL /HPF (ref 0–2)
SPECIFIC GRAVITY UA: 1.02 (ref 1–1.03)
UROBILINOGEN, URINE: 0.2 E.U./DL
WBC UA: ABNORMAL /HPF (ref 0–5)

## 2022-11-27 PROCEDURE — 81001 URINALYSIS AUTO W/SCOPE: CPT

## 2022-11-27 PROCEDURE — 87186 SC STD MICRODIL/AGAR DIL: CPT

## 2022-11-27 PROCEDURE — 99283 EMERGENCY DEPT VISIT LOW MDM: CPT

## 2022-11-27 PROCEDURE — 87088 URINE BACTERIA CULTURE: CPT

## 2022-11-27 RX ORDER — SULFAMETHOXAZOLE AND TRIMETHOPRIM 800; 160 MG/1; MG/1
1 TABLET ORAL 2 TIMES DAILY
Qty: 14 TABLET | Refills: 0 | Status: SHIPPED | OUTPATIENT
Start: 2022-11-27 | End: 2022-12-04

## 2022-11-27 RX ORDER — PHENAZOPYRIDINE HYDROCHLORIDE 100 MG/1
200 TABLET, FILM COATED ORAL 3 TIMES DAILY PRN
Qty: 6 TABLET | Refills: 0 | Status: SHIPPED | OUTPATIENT
Start: 2022-11-27 | End: 2022-11-29

## 2022-11-27 ASSESSMENT — PAIN DESCRIPTION - DESCRIPTORS: DESCRIPTORS: BURNING;DISCOMFORT

## 2022-11-27 ASSESSMENT — PAIN DESCRIPTION - LOCATION: LOCATION: OTHER (COMMENT)

## 2022-11-27 ASSESSMENT — PAIN SCALES - GENERAL: PAINLEVEL_OUTOF10: 10

## 2022-11-27 ASSESSMENT — PAIN DESCRIPTION - FREQUENCY: FREQUENCY: CONTINUOUS

## 2022-11-27 ASSESSMENT — PAIN - FUNCTIONAL ASSESSMENT: PAIN_FUNCTIONAL_ASSESSMENT: 0-10

## 2022-11-27 ASSESSMENT — LIFESTYLE VARIABLES
HOW MANY STANDARD DRINKS CONTAINING ALCOHOL DO YOU HAVE ON A TYPICAL DAY: 1 OR 2
HOW OFTEN DO YOU HAVE A DRINK CONTAINING ALCOHOL: MONTHLY OR LESS

## 2022-11-27 ASSESSMENT — PAIN DESCRIPTION - ONSET: ONSET: ON-GOING

## 2022-11-27 ASSESSMENT — PAIN DESCRIPTION - PAIN TYPE: TYPE: ACUTE PAIN

## 2022-11-27 NOTE — ED PROVIDER NOTES
HPI:  11/27/22,   Time: 10:24 AM HARSH Holliday is a 49 y.o. female presenting to the ED for dysuria/frequency, beginning 3 days ago. The complaint has been intermittent, mild in severity, and worsened by nothing. No fever/chills/sweats/n/v/d/back pain. Taking azo, was initially better, now back. Hx same, thinks uti    Review of Systems:   Pertinent positives and negatives are stated within HPI, all other systems reviewed and are negative.          --------------------------------------------- PAST HISTORY ---------------------------------------------  Past Medical History:  has a past medical history of Cerebral infarction due to embolism of middle cerebral artery ([facility] [address] Hyperlipidemia, Left carotid stenosis, Stroke (cerebrum) ([facility] [address] and Tobacco dependence. Past Surgical History:  has a past surgical history that includes Throat surgery and Carotid endarterectomy (Left, 6/10/2021). Social History:  reports that she has quit smoking. Her smoking use included cigarettes. She smoked an average of 1.5 packs per day. She has never used smokeless tobacco. She reports that she does not currently use drugs after having used the following drugs: Marijuana Ginny Ministerio). She reports that she does not drink alcohol. Family History: family history is not on file. The patients home medications have been reviewed. Allergies: Pcn [penicillins]        ---------------------------------------------------PHYSICAL EXAM--------------------------------------    Constitutional/General: Alert and oriented x3, well appearing, non toxic in NAD  Head: Normocephalic and atraumatic  Eyes: PERRL, EOMI, conjunctive normal, sclera non icteric  Mouth: Oropharynx clear, handling secretions, no trismus, no asymmetry of the posterior oropharynx or uvular edema  Neck: Supple, full ROM,   Respiratory:  Not in respiratory distress  Cardiovascular:  Regular rate. Regular rhythm.  . 2+ distal pulses    GI:  Abdomen Soft, Non tender, Non distended. Musculoskeletal: Moves all extremities x 4. Warm and well perfused, n  Integument: skin warm and dry. No rashes. Lymphatic: no lymphadenopathy noted  Neurologic: GCS 15, no focal deficits,   Psychiatric: Normal Affect    -------------------------------------------------- RESULTS -------------------------------------------------  I have personally reviewed all laboratory and imaging results for this patient. Results are listed below. LABS:  Results for orders placed or performed during the hospital encounter of 11/27/22   Urinalysis with Microscopic   Result Value Ref Range    Color, UA Yellow Straw/Yellow    Clarity, UA SL CLOUDY Clear    Glucose, Ur 500 (A) Negative mg/dL    Bilirubin Urine Negative Negative    Ketones, Urine Negative Negative mg/dL    Specific Gravity, UA 1.020 1.005 - 1.030    Blood, Urine SMALL (A) Negative    pH, UA 5.5 5.0 - 9.0    Protein, UA Negative Negative mg/dL    Urobilinogen, Urine 0.2 <2.0 E.U./dL    Nitrite, Urine POSITIVE (A) Negative    Leukocyte Esterase, Urine Negative Negative       RADIOLOGY:  Interpreted by Radiologist.  No orders to display       EKG: This EKG is signed and interpreted by the EP. Time:   Rate:   Rhythm:   Interpretation:   Comparison:       ------------------------- NURSING NOTES AND VITALS REVIEWED ---------------------------   The nursing notes within the ED encounter and vital signs as below have been reviewed by myself. BP (!) 175/83   Pulse 97   Temp 98.2 °F (36.8 °C) (Oral)   Resp 16   Wt 167 lb (75.8 kg)   SpO2 96%   BMI 33.73 kg/m²   Oxygen Saturation Interpretation: Normal    The patients available past medical records and past encounters were reviewed.         ------------------------------ ED COURSE/MEDICAL DECISION MAKING----------------------  Medications - No data to display      ED COURSE:       Medical Decision Making:    Non toxic, ua pos, dc on po abx      This patient's ED course included: a personal history and physicial examination    This patient has remained hemodynamically stable during their ED course. Counseling: The emergency provider has spoken with the patient and discussed todays results, in addition to providing specific details for the plan of care and counseling regarding the diagnosis and prognosis. Questions are answered at this time and they are agreeable with the plan.       --------------------------------- IMPRESSION AND DISPOSITION ---------------------------------    IMPRESSION  1. Cystitis        DISPOSITION  Disposition: Discharge to home  Patient condition is stable    NOTE: This report was transcribed using voice recognition software.  Every effort was made to ensure accuracy; however, inadvertent computerized transcription errors may be present        Rik Freeman MD  11/27/22 1028

## 2022-11-30 LAB
ORGANISM: ABNORMAL
URINE CULTURE, ROUTINE: ABNORMAL

## 2022-12-01 ENCOUNTER — HOSPITAL ENCOUNTER (EMERGENCY)
Age: 52
Discharge: HOME | End: 2022-12-01
Attending: EMERGENCY MEDICINE
Payer: COMMERCIAL

## 2022-12-01 VITALS
HEART RATE: 86 BPM | SYSTOLIC BLOOD PRESSURE: 157 MMHG | OXYGEN SATURATION: 97 % | TEMPERATURE: 97.7 F | RESPIRATION RATE: 18 BRPM | DIASTOLIC BLOOD PRESSURE: 83 MMHG

## 2022-12-01 DIAGNOSIS — N76.0 VAGINITIS AND VULVOVAGINITIS: Primary | ICD-10-CM

## 2022-12-01 DIAGNOSIS — R03.0 ELEVATED BLOOD PRESSURE READING: ICD-10-CM

## 2022-12-01 LAB
BACTERIA: ABNORMAL /HPF
BILIRUBIN URINE: NEGATIVE
BLOOD, URINE: ABNORMAL
CLARITY: CLEAR
COLOR: YELLOW
EPITHELIAL CELLS, UA: ABNORMAL /HPF
GLUCOSE URINE: 500 MG/DL
HCG(URINE) PREGNANCY TEST: NEGATIVE
KETONES, URINE: NEGATIVE MG/DL
LEUKOCYTE ESTERASE, URINE: NEGATIVE
NITRITE, URINE: NEGATIVE
PH UA: 6 (ref 5–9)
PROTEIN UA: NEGATIVE MG/DL
RBC UA: ABNORMAL /HPF (ref 0–2)
SPECIFIC GRAVITY UA: >=1.03 (ref 1–1.03)
UROBILINOGEN, URINE: 0.2 E.U./DL
WBC UA: ABNORMAL /HPF (ref 0–5)

## 2022-12-01 PROCEDURE — 81001 URINALYSIS AUTO W/SCOPE: CPT

## 2022-12-01 PROCEDURE — 6370000000 HC RX 637 (ALT 250 FOR IP): Performed by: EMERGENCY MEDICINE

## 2022-12-01 PROCEDURE — 99283 EMERGENCY DEPT VISIT LOW MDM: CPT

## 2022-12-01 PROCEDURE — 81025 URINE PREGNANCY TEST: CPT

## 2022-12-01 RX ORDER — KETOROLAC TROMETHAMINE 30 MG/ML
30 INJECTION, SOLUTION INTRAMUSCULAR; INTRAVENOUS ONCE
Status: DISCONTINUED | OUTPATIENT
Start: 2022-12-01 | End: 2022-12-01

## 2022-12-01 RX ORDER — CLOTRIMAZOLE 1 %
CREAM WITH APPLICATOR VAGINAL
Qty: 45 G | Refills: 0 | Status: SHIPPED | OUTPATIENT
Start: 2022-12-01 | End: 2022-12-08

## 2022-12-01 RX ORDER — FLUCONAZOLE 150 MG/1
150 TABLET ORAL ONCE
Status: COMPLETED | OUTPATIENT
Start: 2022-12-01 | End: 2022-12-01

## 2022-12-01 RX ADMIN — FLUCONAZOLE 150 MG: 150 TABLET ORAL at 04:57

## 2022-12-01 NOTE — ED PROVIDER NOTES
HPI:  12/1/22, Time: 3:14 AM HARSH Pitts is a 49 y.o. female presenting to the ED for concern regarding possible continued urinary tract infection states her symptoms of vaginal irritation and discomfort with urination are not improving the complaint has been persistent, mild in severity, and worsened by nothing. Patient's not had any fever/chills, no change in bowel habit patterns. She was treated for urinary tract infection with Bactrim DS p.o. twice daily x7-day course. No relieving factors are reported. No other complaints. Review of Systems:   A complete review of systems was performed and pertinent positives and negatives are stated within HPI, all other systems reviewed and are negative.    --------------------------------------------- PAST HISTORY ---------------------------------------------  Past Medical History:  has a past medical history of Cerebral infarction due to embolism of middle cerebral artery ([address] Hyperlipidemia, Left carotid stenosis, Stroke (cerebrum) ([facility] [address] and Tobacco dependence. Past Surgical History:  has a past surgical history that includes Throat surgery and Carotid endarterectomy (Left, 6/10/2021). Social History:  reports that she has quit smoking. Her smoking use included cigarettes. She smoked an average of 1.5 packs per day. She has never used smokeless tobacco. She reports that she does not currently use drugs after having used the following drugs: Marijuana Sophie Drake). She reports that she does not drink alcohol. Family History: family history is not on file. The patients home medications have been reviewed.     Allergies: Pcn [penicillins]    -------------------------------------------------- RESULTS -------------------------------------------------  All laboratory and radiology results have been personally reviewed by myself   LABS:  Results for orders placed or performed during the hospital encounter of 12/01/22   Urinalysis   Result Value Ref Range    Color, UA Yellow Straw/Yellow    Clarity, UA Clear Clear    Glucose, Ur 500 (A) Negative mg/dL    Bilirubin Urine Negative Negative    Ketones, Urine Negative Negative mg/dL    Specific Gravity, UA >=1.030 1.005 - 1.030    Blood, Urine SMALL (A) Negative    pH, UA 6.0 5.0 - 9.0    Protein, UA Negative Negative mg/dL    Urobilinogen, Urine 0.2 <2.0 E.U./dL    Nitrite, Urine Negative Negative    Leukocyte Esterase, Urine Negative Negative   Pregnancy, Urine   Result Value Ref Range    HCG(Urine) Pregnancy Test NEGATIVE NEGATIVE   Microscopic Urinalysis   Result Value Ref Range    WBC, UA 1-3 0 - 5 /HPF    RBC, UA 0-1 0 - 2 /HPF    Epithelial Cells, UA MODERATE /HPF    Bacteria, UA FEW (A) None Seen /HPF       RADIOLOGY:  Interpreted by Radiologist.  No orders to display       ------------------------- NURSING NOTES AND VITALS REVIEWED ---------------------------    The nursing notes within the ED encounter and vital signs as below have been reviewed. BP (!) 162/82   Pulse 94   Temp 97.7 °F (36.5 °C) (Oral)   Resp 18   SpO2 96%   Oxygen Saturation Interpretation: Normal    ---------------------------------------------------PHYSICAL EXAM--------------------------------------    Constitutional/General: Alert and oriented x3, well appearing, non toxic in NAD  Head: Normocephalic and atraumatic  Eyes: PERRL, EOMI  Mouth: Oropharynx clear, handling secretions, no trismus  Neck: Supple, full ROM, no scleral icterus  Pulmonary: Lungs clear to auscultation bilaterally, no wheezes, rales, or rhonchi. Not in respiratory distress  Cardiovascular:  Regular rate and rhythm, no murmurs, gallops, or rubs. 2+ distal pulses  GI: Soft, non tender, non distended, no organomegaly no masses no CVA tenderness normoactive bowel sounds  Pelvic: deferred per patient request (RN Witness--Cheri)  Extremities: Moves all extremities x 4.  Warm and well perfused  Skin: warm and dry without rash  Neurologic: GCS 15, nerves II through XII intact with no focal deficits. No meningeal signs. Psych: Normal Affect    ------------------------------ ED COURSE/MEDICAL DECISION MAKING----------------------  Medications   fluconazole (DIFLUCAN) tablet 150 mg (has no administration in time range)       ED COURSE:     Medical Decision Making:   Based on patient's symptoms she is suspected to have likely vaginitis which could even be a yeast vaginitis given the fact she has been recently on antibiotics. Patient states that she feels that she might have a yeast infection. Patient does not want to have a pelvic exam here and states she will see her OB/GYN she will have an empiric treatment for suspected yeast vaginitis with Diflucan p.o. plus clotrimazole cream.  Her repeat urinalysis does not show any evidence of urinary tract infection at this time. Patient understands she is to return immediately if she develops any new/worsening symptoms. Counseling: The emergency provider has spoken with the patient and discussed todays results, in addition to providing specific details for the plan of care and counseling regarding the diagnosis and prognosis. Questions are answered at this time and they are agreeable with the plan.    --------------------------------- IMPRESSION AND DISPOSITION ---------------------------------    IMPRESSION  1. Vaginitis and vulvovaginitis        DISPOSITION  Disposition: Discharge to home  Patient condition is stable      NOTE: This report was transcribed using voice recognition software.  Every effort was made to ensure accuracy; however, inadvertent computerized transcription errors may be present        Delai Mott MD  12/01/22 4096

## 2022-12-01 NOTE — DISCHARGE INSTRUCTIONS
NOTE:  Get immediate medical attention if any new/worsening symptoms occur. Make sure to call your OB/GYN for a pelvic exam if symptoms are not significantly improving.

## 2023-01-19 DIAGNOSIS — I65.23 BILATERAL CAROTID ARTERY STENOSIS: Primary | ICD-10-CM

## 2023-01-31 ENCOUNTER — TELEPHONE (OUTPATIENT)
Dept: VASCULAR SURGERY | Age: 53
End: 2023-01-31

## 2023-02-01 ENCOUNTER — HOSPITAL ENCOUNTER (OUTPATIENT)
Dept: CARDIOLOGY | Age: 53
Discharge: HOME | End: 2023-02-01
Payer: COMMERCIAL

## 2023-02-01 ENCOUNTER — OFFICE VISIT (OUTPATIENT)
Dept: VASCULAR SURGERY | Age: 53
End: 2023-02-01
Payer: COMMERCIAL

## 2023-02-01 VITALS — BODY MASS INDEX: 33.73 KG/M2 | HEIGHT: 59 IN

## 2023-02-01 DIAGNOSIS — I65.23 BILATERAL CAROTID ARTERY STENOSIS: ICD-10-CM

## 2023-02-01 DIAGNOSIS — I65.23 BILATERAL CAROTID ARTERY STENOSIS: Primary | ICD-10-CM

## 2023-02-01 PROCEDURE — 1036F TOBACCO NON-USER: CPT | Performed by: PHYSICIAN ASSISTANT

## 2023-02-01 PROCEDURE — G8417 CALC BMI ABV UP PARAM F/U: HCPCS | Performed by: PHYSICIAN ASSISTANT

## 2023-02-01 PROCEDURE — 93880 EXTRACRANIAL BILAT STUDY: CPT

## 2023-02-01 PROCEDURE — 3017F COLORECTAL CA SCREEN DOC REV: CPT | Performed by: PHYSICIAN ASSISTANT

## 2023-02-01 PROCEDURE — G8427 DOCREV CUR MEDS BY ELIG CLIN: HCPCS | Performed by: PHYSICIAN ASSISTANT

## 2023-02-01 PROCEDURE — G8484 FLU IMMUNIZE NO ADMIN: HCPCS | Performed by: PHYSICIAN ASSISTANT

## 2023-02-01 PROCEDURE — 99213 OFFICE O/P EST LOW 20 MIN: CPT | Performed by: PHYSICIAN ASSISTANT

## 2023-02-01 NOTE — PROGRESS NOTES
[facility] Heart & Vascular Lab - [facility]    This is a pre read worksheet - prior to official physician interpretation    Agueda CHAPA Brianne Rylee  1970  Date of study: 2/1/23    Indication for study:  Carotid artery stenosis  Study : Bilateral Carotid Artery Duplex Examination    Duplex examination of the RIGHT carotid artery system identifies atherosclerotic plaque. The peak systolic velocity in internal carotid artery was 115 centimeters / second. The maximum end diastolic velocity was 37 centimeters / second. The ICA/CCA ratio is 0.98. The right vertebral artery has antegrade flow. Duplex examination of the LEFT carotid artery system identifies atherosclerotic plaque. The peak systolic velocity in internal carotid artery was 140 centimeters / second. The maximum end diastolic velocity was 49 centimeters / second. The ICA/CCA ratio is 1.1. The left vertebral artery has antegrade flow.     RIGHT 20- 30%  LEFT mild thickening      LAST STUDY  7/22/2022  Rt 0-49  Lt 0-49

## 2023-02-01 NOTE — PROGRESS NOTES
Vascular Surgery Outpatient Progress Note      Chief Complaint   Patient presents with    Circulatory Problem     Bilateral carotid artery stenosis       HISTORY OF PRESENT ILLNESS:                The patient is a 47 y.o. female who returns for follow-up evaluation of carotid artery disease s/p L CEA 6/10/2021. She denies any symptoms of stroke, ministroke, or amaurosis fugax. She overall is doing very well. 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 She stopped smoking the day of her procedure. She is on asa, statin therapy. Past Medical History:        Diagnosis Date    Cerebral infarction due to embolism of middle cerebral artery ([address] 6/9/2021    Hyperlipidemia     Left carotid stenosis     Stroke (cerebrum) ([facility] [address]     Tobacco dependence 6/9/2021     Past Surgical History:        Procedure Laterality Date    CAROTID ENDARTERECTOMY Left 6/10/2021    CAROTID ENDARTERECTOMY performed by Levi Jean MD at [address]      nodule removed     Current Medications:   Prior to Admission medications    Medication Sig Start Date End Date Taking?  Authorizing Provider   furosemide (LASIX) 20 MG tablet Take 20 mg by mouth in the morning. 6/22/22  Yes Historical Provider, MD   atorvastatin (LIPITOR) 40 MG tablet Take 1 tablet by mouth daily 6/11/21  Yes TODD Lindo CNP   aspirin EC 81 MG EC tablet Take 1 tablet by mouth daily 6/11/21  Yes TODD Lindo CNP   metoprolol tartrate (LOPRESSOR) 25 MG tablet Take 0.5 tablets by mouth 2 times daily 6/11/21  Yes Nilda Culver MD     Allergies:  Pcn [penicillins]    Social History     Socioeconomic History    Marital status: Single     Spouse name: Not on file    Number of children: Not on file    Years of education: Not on file    Highest education level: Not on file   Occupational History    Not on file   Tobacco Use    Smoking status: Former     Packs/day: 1.50     Types: Cigarettes    Smokeless tobacco: Never   Substance and Sexual Activity    Alcohol use: No    Drug use: Not Currently     Types: Marijuana Suzan Jacobson    Sexual activity: Not on file   Other Topics Concern    Not on file   Social History Narrative    Not on file     Social Determinants of Health     Financial Resource Strain: Not on file   Food Insecurity: Not on file   Transportation Needs: Not on file   Physical Activity: Not on file   Stress: Not on file   Social Connections: Not on file   Intimate Partner Violence: Not on file   Housing Stability: Not on file        No family history on file.     REVIEW OF SYSTEMS (New symptoms):    Eyes:      Blurred vision:  No [x]/Yes []               Diplopia:   No [x]/Yes []               Vision loss:       No [x]/Yes []   Ears, nose, throat:             Hearing loss:    No [x]/Yes []      Vertigo:   No [x]/Yes []                       Swallowing problem:  No [x]/Yes []               Nose bleeds:   No [x]/Yes []      Voice hoarseness:  No [x]/Yes []  Respiratory:             Cough:   No [x]/Yes []      Pleuritic chest pain:  No [x]/Yes []                        Dyspnea:   No [x]/Yes []      Wheezing:   No [x]/Yes []  Cardiovascular:             Angina:   No [x]/Yes []      Palpitations:   No [x]/Yes []          Claudication:    No [x]/Yes []      Leg swelling:   No [x]/Yes []  Gastrointestinal:             Nausea or vomiting:  No [x]/Yes []               Abdominal pain:  No [x]/Yes []                     Intestinal bleeding: No [x]/Yes []  Musculoskeletal:             Leg pain:   No [x]/Yes []      Back pain:   No [x]/Yes []                    Weakness:   No [x]/Yes []  Neurologic:             Numbness:   No [x]/Yes []      Paralysis:   No [x]/Yes []                       Headaches:   No [x]/Yes []  Hematologic, lymphatic:   Anemia:   No [x]/Yes []              Bleeding or bruising:  No [x]/Yes []              Fevers or chills: No [x]/Yes []  Endocrine:             Temp intolerance:   No [x]/Yes [] Polydipsia, polyuria:  No [x]/Yes []  Skin:              Rash:    No [x]/Yes []      Ulcers:   No [x]/Yes []              Abnorm pigment: No [x]/Yes []  :              Frequency/urgency:  No [x]/Yes []      Hematuria:    No [x]/Yes []                      Incontinence:    No [x]/Yes []    PHYSICAL EXAM:  There were no vitals filed for this visit. General Appearance: alert and oriented to person, place and time, in no acute distress, well developed and well- nourished  Neurologic: no cranial nerve deficit, speech normal  Head: normocephalic and atraumatic  Eyes: extraocular eye movements intact, conjunctivae normal  ENT: external ear and ear canal normal bilaterally, nose without deformity  Pulmonary/Chest: normal air movement, no respiratory distress  Cardiovascular: normal rate, regular rhythm  Abdomen: non-distended, no masses  Musculoskeletal: no joint deformity or tenderness  Extremities: no leg edema bilaterally  Skin: warm and dry, no rash or erythema    PULSE EXAM      Right      Left   Brachial     Radial 2 2   Femoral     Popliteal     Dorsalis Pedis 1 0-1   Posterior Tibial     (3=normal, 2=diminished, 1=barely palpable, 4=widened)    RADIOLOGY: Carotid US:  Right 115 / 37 (0.98), 1-49%. Left 140 / 49 (1.1), 1-49%. Problem List Items Addressed This Visit          Circulatory    Bilateral carotid artery stenosis - Primary    Relevant Orders    US CAROTID ARTERY BILATERAL       I reviewed the carotid ultrasound with the patient showing <50% stenosis bilaterally s/p L CEA. I reviewed the natural history and treatment options of carotid artery disease. I reviewed the signs and symptoms of stroke, and instructions if symptoms occur. I encouraged continued tobacco avoidance and to remain on asa, statin therapy. We will see her back in 1 year with repeat carotid US or sooner with any issues. Tigist Nagy PA-C    Return in about 1 year (around 2/1/2024).

## 2023-02-02 NOTE — PROCEDURES
[address]                  [address] [facility],  [facility]                                VASCULAR REPORT    PATIENT NAME: Yehuda Gao                   :        1970  MED REC NO:   [id number]                            ROOM:  ACCOUNT NO:   [de-identified]                           ADMIT DATE: 2023  PROVIDER:     Lindsay Yen MD    DATE OF PROCEDURE:  2023    INDICATION:  Carotid bruit with history of carotid endarterectomy. FINDINGS:  Duplex scanning of the right carotid artery revealed the  patient has moderate intimal thickening with a peak internal carotid  velocity of 685, diastolic velocity of 32 cm/sec with approximately 20% to 30%  stenosis. Duplex scanning of the left carotid artery revealed mild intimal  thickening with a peak internal carotid velocity of 962, diastolic  velocity of 49 cm/sec without any stenosis. IMPRESSION:  Minimal stenosis of 20% to 30% on the right, with normal  study on the left, baseline, unchanged from last year.         Litzy Pepe MD    D: 2023 20:05:27       T: 2023 8:11:08     PALMA/TIA_CHIARA  Job#: [id number]     Doc#: [id number]    CC:

## 2023-04-22 ENCOUNTER — APPOINTMENT (OUTPATIENT)
Dept: GENERAL RADIOLOGY | Age: 53
End: 2023-04-22
Payer: COMMERCIAL

## 2023-04-22 ENCOUNTER — HOSPITAL ENCOUNTER (EMERGENCY)
Age: 53
Discharge: HOME | End: 2023-04-22
Attending: STUDENT IN AN ORGANIZED HEALTH CARE EDUCATION/TRAINING PROGRAM
Payer: COMMERCIAL

## 2023-04-22 VITALS
TEMPERATURE: 97.5 F | BODY MASS INDEX: 35.48 KG/M2 | RESPIRATION RATE: 18 BRPM | HEIGHT: 59 IN | WEIGHT: 176 LBS | DIASTOLIC BLOOD PRESSURE: 80 MMHG | HEART RATE: 86 BPM | OXYGEN SATURATION: 98 % | SYSTOLIC BLOOD PRESSURE: 160 MMHG

## 2023-04-22 DIAGNOSIS — M62.838 SPASM OF MUSCLE: Primary | ICD-10-CM

## 2023-04-22 PROCEDURE — 96372 THER/PROPH/DIAG INJ SC/IM: CPT

## 2023-04-22 PROCEDURE — 99284 EMERGENCY DEPT VISIT MOD MDM: CPT

## 2023-04-22 PROCEDURE — 6360000002 HC RX W HCPCS: Performed by: STUDENT IN AN ORGANIZED HEALTH CARE EDUCATION/TRAINING PROGRAM

## 2023-04-22 PROCEDURE — 72110 X-RAY EXAM L-2 SPINE 4/>VWS: CPT

## 2023-04-22 PROCEDURE — 6370000000 HC RX 637 (ALT 250 FOR IP): Performed by: STUDENT IN AN ORGANIZED HEALTH CARE EDUCATION/TRAINING PROGRAM

## 2023-04-22 RX ORDER — LIDOCAINE 4 G/G
1 PATCH TOPICAL DAILY
Qty: 5 PATCH | Refills: 0 | Status: SHIPPED | OUTPATIENT
Start: 2023-04-22 | End: 2023-04-27

## 2023-04-22 RX ORDER — NAPROXEN 500 MG/1
500 TABLET ORAL 2 TIMES DAILY PRN
Qty: 14 TABLET | Refills: 0 | Status: SHIPPED | OUTPATIENT
Start: 2023-04-22

## 2023-04-22 RX ORDER — KETOROLAC TROMETHAMINE 30 MG/ML
30 INJECTION, SOLUTION INTRAMUSCULAR; INTRAVENOUS ONCE
Status: COMPLETED | OUTPATIENT
Start: 2023-04-22 | End: 2023-04-22

## 2023-04-22 RX ORDER — LIDOCAINE 4 G/G
1 PATCH TOPICAL ONCE
Status: DISCONTINUED | OUTPATIENT
Start: 2023-04-22 | End: 2023-04-22 | Stop reason: HOSPADM

## 2023-04-22 RX ORDER — ORPHENADRINE CITRATE 100 MG/1
100 TABLET, EXTENDED RELEASE ORAL 2 TIMES DAILY
Qty: 14 TABLET | Refills: 0 | Status: SHIPPED | OUTPATIENT
Start: 2023-04-22 | End: 2023-05-02

## 2023-04-22 RX ADMIN — KETOROLAC TROMETHAMINE 30 MG: 30 INJECTION, SOLUTION INTRAMUSCULAR at 08:26

## 2023-04-22 ASSESSMENT — PAIN SCALES - GENERAL
PAINLEVEL_OUTOF10: 10
PAINLEVEL_OUTOF10: 10
PAINLEVEL_OUTOF10: 8

## 2023-04-22 ASSESSMENT — PAIN DESCRIPTION - DESCRIPTORS
DESCRIPTORS: TIGHTNESS;DISCOMFORT
DESCRIPTORS: ACHING;DISCOMFORT
DESCRIPTORS: TIGHTNESS;DISCOMFORT

## 2023-04-22 ASSESSMENT — PAIN DESCRIPTION - ORIENTATION
ORIENTATION: LOWER;MID
ORIENTATION: MID;LOWER
ORIENTATION: LOWER

## 2023-04-22 ASSESSMENT — PAIN DESCRIPTION - LOCATION
LOCATION: BACK

## 2023-04-22 NOTE — DISCHARGE INSTRUCTIONS
You are diagnosed with back pain please take the lidocaine patches 12 hours on 12 hours off naproxen twice daily and Norflex twice daily as needed for muscle spasms. Do not drive or operate heavy machinery while taking the Norflex as it is a sedating medication. Should you begin experiencing fevers chills weakness numbness or tingling changes in bowel or bladder habits call 911 or return immediately otherwise follow-up with your primary physician in the coming days to ensure improvement.

## 2023-04-22 NOTE — ED PROVIDER NOTES
4/22/2023 10:07 AM        START taking these medications    Details   orphenadrine (NORFLEX) 100 MG extended release tablet Take 1 tablet by mouth 2 times daily for 10 days, Disp-14 tablet, R-0Print      naproxen (NAPROSYN) 500 MG tablet Take 1 tablet by mouth 2 times daily as needed for Pain, Disp-14 tablet, R-0Print      lidocaine 4 % external patch Place 1 patch onto the skin daily for 5 days, TransDERmal, DAILY Starting Sat 4/22/2023, Until u 4/27/2023, For 5 days, Disp-5 patch, R-0, Print             Follow-up:  Amy Rodriguez MD  [address]    Schedule an appointment as soon as possible for a visit in 1 day      [address] [facility] Emergency Department  [place] [address] [address]  [phone]  Go to   If symptoms worsen        Final Impression:   1. Spasm of muscle          (Please note that portions of this note were completed with a voice recognition program.  Efforts were made to edit the dictations but occasionally words are mis-transcribed. Sasha Marinelli DO  04/22/23 [id number]

## 2023-11-12 ENCOUNTER — APPOINTMENT (OUTPATIENT)
Dept: GENERAL RADIOLOGY | Age: 53
End: 2023-11-12
Payer: COMMERCIAL

## 2023-11-12 ENCOUNTER — HOSPITAL ENCOUNTER (EMERGENCY)
Age: 53
Discharge: HOME | End: 2023-11-12
Payer: COMMERCIAL

## 2023-11-12 VITALS
SYSTOLIC BLOOD PRESSURE: 116 MMHG | OXYGEN SATURATION: 98 % | BODY MASS INDEX: 33.93 KG/M2 | HEART RATE: 82 BPM | WEIGHT: 168 LBS | TEMPERATURE: 97.6 F | DIASTOLIC BLOOD PRESSURE: 84 MMHG | RESPIRATION RATE: 14 BRPM

## 2023-11-12 DIAGNOSIS — M54.16 LUMBAR RADICULOPATHY: Primary | ICD-10-CM

## 2023-11-12 DIAGNOSIS — M54.42 ACUTE LEFT-SIDED LOW BACK PAIN WITH LEFT-SIDED SCIATICA: ICD-10-CM

## 2023-11-12 PROCEDURE — 99284 EMERGENCY DEPT VISIT MOD MDM: CPT

## 2023-11-12 PROCEDURE — 72100 X-RAY EXAM L-S SPINE 2/3 VWS: CPT

## 2023-11-12 PROCEDURE — 96372 THER/PROPH/DIAG INJ SC/IM: CPT

## 2023-11-12 PROCEDURE — 6370000000 HC RX 637 (ALT 250 FOR IP): Performed by: NURSE PRACTITIONER

## 2023-11-12 PROCEDURE — 6360000002 HC RX W HCPCS: Performed by: NURSE PRACTITIONER

## 2023-11-12 RX ORDER — LIDOCAINE 4 G/G
1 PATCH TOPICAL ONCE
Status: DISCONTINUED | OUTPATIENT
Start: 2023-11-12 | End: 2023-11-12 | Stop reason: HOSPADM

## 2023-11-12 RX ORDER — PREDNISONE 20 MG/1
20 TABLET ORAL 2 TIMES DAILY
Qty: 10 TABLET | Refills: 0 | Status: SHIPPED | OUTPATIENT
Start: 2023-11-12 | End: 2023-11-17

## 2023-11-12 RX ORDER — ORPHENADRINE CITRATE 100 MG/1
100 TABLET, EXTENDED RELEASE ORAL 2 TIMES DAILY
Qty: 20 TABLET | Refills: 0 | Status: SHIPPED | OUTPATIENT
Start: 2023-11-12 | End: 2023-11-22

## 2023-11-12 RX ORDER — KETOROLAC TROMETHAMINE 30 MG/ML
30 INJECTION, SOLUTION INTRAMUSCULAR; INTRAVENOUS ONCE
Status: COMPLETED | OUTPATIENT
Start: 2023-11-12 | End: 2023-11-12

## 2023-11-12 RX ORDER — ORPHENADRINE CITRATE 30 MG/ML
60 INJECTION INTRAMUSCULAR; INTRAVENOUS ONCE
Status: COMPLETED | OUTPATIENT
Start: 2023-11-12 | End: 2023-11-12

## 2023-11-12 RX ORDER — IBUPROFEN 800 MG/1
800 TABLET ORAL EVERY 8 HOURS PRN
Qty: 30 TABLET | Refills: 0 | Status: SHIPPED | OUTPATIENT
Start: 2023-11-12

## 2023-11-12 RX ORDER — PREDNISONE 20 MG/1
20 TABLET ORAL ONCE
Status: COMPLETED | OUTPATIENT
Start: 2023-11-12 | End: 2023-11-12

## 2023-11-12 RX ADMIN — KETOROLAC TROMETHAMINE 30 MG: 30 INJECTION, SOLUTION INTRAMUSCULAR; INTRAVENOUS at 12:10

## 2023-11-12 RX ADMIN — PREDNISONE 20 MG: 20 TABLET ORAL at 12:10

## 2023-11-12 RX ADMIN — ORPHENADRINE CITRATE 60 MG: 60 INJECTION INTRAMUSCULAR; INTRAVENOUS at 12:10

## 2023-11-12 ASSESSMENT — LIFESTYLE VARIABLES: HOW OFTEN DO YOU HAVE A DRINK CONTAINING ALCOHOL: NEVER

## 2023-11-12 ASSESSMENT — PATIENT HEALTH QUESTIONNAIRE - PHQ9
1. LITTLE INTEREST OR PLEASURE IN DOING THINGS: 0
SUM OF ALL RESPONSES TO PHQ QUESTIONS 1-9: 0
SUM OF ALL RESPONSES TO PHQ9 QUESTIONS 1 & 2: 0
2. FEELING DOWN, DEPRESSED OR HOPELESS: 0
SUM OF ALL RESPONSES TO PHQ QUESTIONS 1-9: 0

## 2023-11-12 ASSESSMENT — PAIN - FUNCTIONAL ASSESSMENT
PAIN_FUNCTIONAL_ASSESSMENT: 0-10
PAIN_FUNCTIONAL_ASSESSMENT: NONE - DENIES PAIN

## 2023-11-12 ASSESSMENT — PAIN DESCRIPTION - LOCATION
LOCATION: BACK
LOCATION: BACK

## 2023-11-12 ASSESSMENT — PAIN SCALES - GENERAL
PAINLEVEL_OUTOF10: 9
PAINLEVEL_OUTOF10: 8

## 2023-11-12 ASSESSMENT — PAIN DESCRIPTION - ORIENTATION: ORIENTATION: LEFT;LOWER

## 2023-11-12 ASSESSMENT — PAIN DESCRIPTION - DESCRIPTORS: DESCRIPTORS: ACHING;DISCOMFORT;DULL;SPASM

## 2023-11-12 NOTE — DISCHARGE INSTRUCTIONS
Prednisone twice daily for 5 days, Norflex twice daily as needed for muscle spasm, ibuprofen 8 hours as needed for pain. If your symptoms do not improve with conservative treatment please follow-up with neurosurgery. I attached some exercises that you may try at home.

## 2023-11-12 NOTE — ED PROVIDER NOTES
degenerative changes seen within the lumbar spine most   pronounced at the L4/L5 level with no evidence of acute fracture or traumatic   malalignment. ED Course / Medical Decision Making     Medications   ketorolac (TORADOL) injection 30 mg (30 mg IntraMUSCular Given 11/12/23 1210)   predniSONE (DELTASONE) tablet 20 mg (20 mg Oral Given 11/12/23 1210)   orphenadrine (NORFLEX) injection 60 mg (60 mg IntraMUSCular Given 11/12/23 1210)     ED Course as of 11/14/23 0622   Sun Nov 12, 2023   1329 Reevaluation of patient, patient states that she is feeling somewhat better, the pain is not completely gone but is more tolerable, she is able to sit. Advised that I am awaiting the radiologist read. [CS]      ED Course User Index  [CS] TODD Humphrey - CNP     Consult(s):   None    Procedure(s):   None    Medical Decision Making: Briefly this is a 71-year-old female patient who initially denies any history of low back pain however on chart review patient has been seen in the ER previously for low back pain. Patient presents with complaints of a 2-day history of left-sided low back pain with radiation to the buttocks after bending over to do yard work. Did not have any direct trauma to the back. Has no red flag symptoms. On exam patient's DTRs are intact, she ambulates with her required cane, negative straight leg test bilaterally. Mild paraspinal tenderness noted to the L5-S1 region with tenderness over the SI joint. There are no open areas, wounds, rashes, step-offs, swelling noted. Patient was medicated with prednisone, Toradol, Norflex. Plain film x-rays of the lumbar spine were obtained there revealed multilevel degenerative changes with no evidence of an acute fracture. Following medication, patient's pain did improve and she was able to ambulate with no difficulty. She continued to deny any red flag symptoms.   Patient was advised that she will need to follow-up with her PCP as she may

## 2024-01-30 DIAGNOSIS — I65.23 BILATERAL CAROTID ARTERY STENOSIS: Primary | ICD-10-CM

## 2024-03-20 ENCOUNTER — OFFICE VISIT (OUTPATIENT)
Dept: VASCULAR SURGERY | Age: 54
End: 2024-03-20
Payer: COMMERCIAL

## 2024-03-20 ENCOUNTER — HOSPITAL ENCOUNTER (OUTPATIENT)
Dept: CARDIOLOGY | Age: 54
Discharge: HOME | End: 2024-03-22
Attending: SURGERY
Payer: COMMERCIAL

## 2024-03-20 VITALS — WEIGHT: 170 LBS | BODY MASS INDEX: 34.34 KG/M2

## 2024-03-20 DIAGNOSIS — I65.23 BILATERAL CAROTID ARTERY STENOSIS: Primary | ICD-10-CM

## 2024-03-20 DIAGNOSIS — I65.23 BILATERAL CAROTID ARTERY STENOSIS: ICD-10-CM

## 2024-03-20 LAB
VAS LEFT CCA DIST EDV: 34.5 CM/S
VAS LEFT CCA DIST PSV: 116.6 CM/S
VAS LEFT CCA PROX EDV: 27.2 CM/S
VAS LEFT CCA PROX PSV: 111.2 CM/S
VAS LEFT ECA EDV: 16.2 CM/S
VAS LEFT ECA PSV: 114.8 CM/S
VAS LEFT ICA DIST EDV: 36.1 CM/S
VAS LEFT ICA DIST PSV: 103.2 CM/S
VAS LEFT ICA MID EDV: 36.3 CM/S
VAS LEFT ICA MID PSV: 114.8 CM/S
VAS LEFT ICA PROX EDV: 40 CM/S
VAS LEFT ICA PROX PSV: 120.3 CM/S
VAS LEFT ICA/CCA PSV: 1 NO UNITS
VAS LEFT VERTEBRAL EDV: 29 CM/S
VAS LEFT VERTEBRAL PSV: 80.1 CM/S
VAS RIGHT CCA DIST EDV: 23.2 CM/S
VAS RIGHT CCA DIST PSV: 82.8 CM/S
VAS RIGHT CCA PROX EDV: 24.5 CM/S
VAS RIGHT CCA PROX PSV: 102.2 CM/S
VAS RIGHT ECA EDV: 18.1 CM/S
VAS RIGHT ECA PSV: 140.4 CM/S
VAS RIGHT ICA DIST EDV: 30.7 CM/S
VAS RIGHT ICA DIST PSV: 88.6 CM/S
VAS RIGHT ICA MID EDV: 25.8 CM/S
VAS RIGHT ICA MID PSV: 72.4 CM/S
VAS RIGHT ICA PROX EDV: 24.5 CM/S
VAS RIGHT ICA PROX PSV: 91.9 CM/S
VAS RIGHT ICA/CCA PSV: 0.9 NO UNITS
VAS RIGHT VERTEBRAL EDV: 15.3 CM/S
VAS RIGHT VERTEBRAL PSV: 45 CM/S

## 2024-03-20 PROCEDURE — 93880 EXTRACRANIAL BILAT STUDY: CPT | Performed by: SURGERY

## 2024-03-20 PROCEDURE — 99213 OFFICE O/P EST LOW 20 MIN: CPT | Performed by: SURGERY

## 2024-03-20 PROCEDURE — 93880 EXTRACRANIAL BILAT STUDY: CPT

## 2024-03-20 RX ORDER — LISINOPRIL 10 MG/1
10 TABLET ORAL DAILY
COMMUNITY
Start: 2024-03-08

## 2024-03-20 NOTE — PROGRESS NOTES
Vascular Surgery Outpatient Consultation      Chief Complaint   Patient presents with    Circulatory Problem     Bilateral carotid artery stenosis       Reason for Consult:  Review ultrasound      HISTORY OF PRESENT ILLNESS:                The patient is a 53 y.o. female who presents to review her ultrasound.  She is a prior Dr. Francois patient.  She has a history of a left carotid endarterectomy.  She was lost to follow-up for couple of years but denies any new changes.  She is on an aspirin and statin.  She quit smoking at the time of her operation and has not resumed since then.    Past Medical History:        Diagnosis Date    Cerebral infarction due to embolism of middle cerebral artery (HCC) 6/9/2021    Hyperlipidemia     Left carotid stenosis     Stroke (cerebrum) (HCC)     Tobacco dependence 6/9/2021     Past Surgical History:        Procedure Laterality Date    CAROTID ENDARTERECTOMY Left 6/10/2021    CAROTID ENDARTERECTOMY performed by Dylon Francois MD at [facility]    THROAT SURGERY      nodule removed     Current Medications:   Prior to Admission medications    Medication Sig Start Date End Date Taking? Authorizing Provider   empagliflozin (JARDIANCE) 25 MG tablet Take 1 tablet by mouth daily   Yes Provider, MD Mir   metFORMIN (GLUCOPHAGE) 1000 MG tablet Take 1 tablet by mouth 2 times daily (with meals)   Yes Provider, MD Mir   lisinopril (PRINIVIL;ZESTRIL) 10 MG tablet Take 1 tablet by mouth daily 3/8/24  Yes ProviderMir MD   ibuprofen (ADVIL;MOTRIN) 800 MG tablet Take 1 tablet by mouth every 8 hours as needed for Pain 11/12/23  Yes Shannen Ulrich APRN - CNP   atorvastatin (LIPITOR) 40 MG tablet Take 1 tablet by mouth daily 6/11/21  Yes Leonora Arias APRN - CNP   aspirin EC 81 MG EC tablet Take 1 tablet by mouth daily 6/11/21  Yes Leonora Arias APRN - CNP   metoprolol tartrate (LOPRESSOR) 25 MG tablet Take 0.5 tablets by mouth 2 times daily 6/11/21  Yes

## 2025-03-21 DIAGNOSIS — I65.23 BILATERAL CAROTID ARTERY STENOSIS: Primary | ICD-10-CM

## 2025-03-26 ENCOUNTER — HOSPITAL ENCOUNTER (OUTPATIENT)
Dept: CARDIOLOGY | Age: 55
Discharge: HOME | End: 2025-03-28
Payer: COMMERCIAL

## 2025-03-26 ENCOUNTER — OFFICE VISIT (OUTPATIENT)
Dept: VASCULAR SURGERY | Age: 55
End: 2025-03-26
Payer: COMMERCIAL

## 2025-03-26 DIAGNOSIS — I65.23 BILATERAL CAROTID ARTERY STENOSIS: ICD-10-CM

## 2025-03-26 DIAGNOSIS — I65.23 CAROTID STENOSIS, ASYMPTOMATIC, BILATERAL: Primary | ICD-10-CM

## 2025-03-26 LAB
VAS LEFT CCA DIST EDV: 31.4 CM/S
VAS LEFT CCA DIST PSV: 99.3 CM/S
VAS LEFT CCA PROX EDV: 27.8 CM/S
VAS LEFT CCA PROX PSV: 93.2 CM/S
VAS LEFT ECA EDV: 19.9 CM/S
VAS LEFT ECA PSV: 103.9 CM/S
VAS LEFT ICA DIST EDV: 43.4 CM/S
VAS LEFT ICA DIST PSV: 106.9 CM/S
VAS LEFT ICA MID EDV: 43.6 CM/S
VAS LEFT ICA MID PSV: 111.2 CM/S
VAS LEFT ICA PROX EDV: 33.8 CM/S
VAS LEFT ICA PROX PSV: 72.6 CM/S
VAS LEFT ICA/CCA PSV: 1.1 NO UNITS
VAS LEFT VERTEBRAL EDV: 27.4 CM/S
VAS LEFT VERTEBRAL PSV: 66.9 CM/S
VAS RIGHT CCA DIST EDV: 20.6 CM/S
VAS RIGHT CCA DIST PSV: 81.5 CM/S
VAS RIGHT CCA PROX EDV: 19.3 CM/S
VAS RIGHT CCA PROX PSV: 85.4 CM/S
VAS RIGHT ECA EDV: 17.2 CM/S
VAS RIGHT ECA PSV: 132.6 CM/S
VAS RIGHT ICA DIST EDV: 25.8 CM/S
VAS RIGHT ICA DIST PSV: 63.4 CM/S
VAS RIGHT ICA MID EDV: 24.5 CM/S
VAS RIGHT ICA MID PSV: 81.5 CM/S
VAS RIGHT ICA PROX EDV: 24.5 CM/S
VAS RIGHT ICA PROX PSV: 75 CM/S
VAS RIGHT ICA/CCA PSV: 1 NO UNITS
VAS RIGHT VERTEBRAL EDV: 14 CM/S
VAS RIGHT VERTEBRAL PSV: 44.1 CM/S

## 2025-03-26 PROCEDURE — 99212 OFFICE O/P EST SF 10 MIN: CPT | Performed by: NURSE PRACTITIONER

## 2025-03-26 PROCEDURE — 93880 EXTRACRANIAL BILAT STUDY: CPT

## 2025-03-26 PROCEDURE — G8421 BMI NOT CALCULATED: HCPCS | Performed by: NURSE PRACTITIONER

## 2025-03-26 PROCEDURE — 3017F COLORECTAL CA SCREEN DOC REV: CPT | Performed by: NURSE PRACTITIONER

## 2025-03-26 PROCEDURE — G8427 DOCREV CUR MEDS BY ELIG CLIN: HCPCS | Performed by: NURSE PRACTITIONER

## 2025-03-26 PROCEDURE — 1036F TOBACCO NON-USER: CPT | Performed by: NURSE PRACTITIONER

## 2025-03-26 PROCEDURE — 93880 EXTRACRANIAL BILAT STUDY: CPT | Performed by: SURGERY

## 2025-03-26 NOTE — PROGRESS NOTES
Vascular Surgery Outpatient Progress Note      Chief Complaint   Patient presents with    Follow-up     PIERRE.        HISTORY OF PRESENT ILLNESS:                The patient is a 54 y.o. female who returns for follow-up evaluation of carotid artery stenosis. She has a history of left CEA by Dr. Francois in 2021. This was done for symptomatic carotid stenosis. The patient denies any recent symptoms of stroke, mini stroke, or amaurosis fugax. She had an ultrasound done for today's visit.     Past Medical History:        Diagnosis Date    Cerebral infarction due to embolism of middle cerebral artery (HCC) 6/9/2021    Hyperlipidemia     Left carotid stenosis     Stroke (cerebrum) (HCC)     Tobacco dependence 6/9/2021     Past Surgical History:        Procedure Laterality Date    CAROTID ENDARTERECTOMY Left 6/10/2021    CAROTID ENDARTERECTOMY performed by Dylon Francois MD at [facility]    THROAT SURGERY      nodule removed     Current Medications:   Prior to Admission medications    Medication Sig Start Date End Date Taking? Authorizing Provider   empagliflozin (JARDIANCE) 25 MG tablet Take 1 tablet by mouth daily   Yes Provider, Historical, MD   metFORMIN (GLUCOPHAGE) 1000 MG tablet Take 1 tablet by mouth 2 times daily (with meals)   Yes Provider, Historical, MD   lisinopril (PRINIVIL;ZESTRIL) 10 MG tablet Take 1 tablet by mouth daily 3/8/24  Yes Provider, Historical, MD   ibuprofen (ADVIL;MOTRIN) 800 MG tablet Take 1 tablet by mouth every 8 hours as needed for Pain 11/12/23  Yes Shannen Ulrich APRN - CNP   furosemide (LASIX) 20 MG tablet Take 1 tablet by mouth daily 6/22/22  Yes Provider, Historical, MD   atorvastatin (LIPITOR) 40 MG tablet Take 1 tablet by mouth daily 6/11/21  Yes Leonora Arias APRN - CNP   aspirin EC 81 MG EC tablet Take 1 tablet by mouth daily 6/11/21  Yes Leonora Arias APRN - CNP   metoprolol tartrate (LOPRESSOR) 25 MG tablet Take 0.5 tablets by mouth 2 times daily 6/11/21  Yes

## (undated) DEVICE — SOLUTION IV IRRIG 500ML 0.9% SODIUM CHL 2F7123

## (undated) DEVICE — TOWEL,OR,DSP,ST,BLUE,STD,6/PK,12PK/CS: Brand: MEDLINE

## (undated) DEVICE — 72" ARTERIAL PRESSURE TUBING: Brand: ICU MEDICAL

## (undated) DEVICE — MAGNETIC INSTR DRAPE 20X16: Brand: MEDLINE INDUSTRIES, INC.

## (undated) DEVICE — CLIP LIG M BLU TI HRT SHP WIRE HORZ 180 PER BX

## (undated) DEVICE — LABEL MED 4 IN SURG PANEL W/ PEN STRL

## (undated) DEVICE — PATIENT RETURN ELECTRODE, SINGLE-USE, CONTACT QUALITY MONITORING, ADULT, WITH 9FT CORD, FOR PATIENTS WEIGING OVER 33LBS. (15KG): Brand: MEGADYNE

## (undated) DEVICE — BLADE CLIPPER GEN PURP NS

## (undated) DEVICE — NEEDLE HYPO 21GA L1.5IN GRN POLYPR HUB S STL REG BVL STR

## (undated) DEVICE — PACK,LAPAROTOMY,NO GOWNS: Brand: MEDLINE

## (undated) DEVICE — SET INSTR ART 1

## (undated) DEVICE — DOUBLE BASIN SET: Brand: MEDLINE INDUSTRIES, INC.

## (undated) DEVICE — CLOTH SURG PREP PREOPERATIVE CHLORHEXIDINE GLUC 2% READYPREP

## (undated) DEVICE — SYRINGE MED 3ML CLR PLAS STD N CTRL LUERLOCK TIP DISP

## (undated) DEVICE — GOWN,SIRUS,FABRNF,XL,20/CS: Brand: MEDLINE

## (undated) DEVICE — TOTAL TRAY, 16FR 10ML SIL FOLEY, URN: Brand: MEDLINE

## (undated) DEVICE — NEEDLE HYPO 26GA L0.625IN TAN POLYPR HUB S STL REG BVL STR

## (undated) DEVICE — TOWEL,OR,DSP,ST,WHITE,DLX,4/PK,20PK/CS: Brand: MEDLINE

## (undated) DEVICE — SYSTEM CATH 20GA L1IN OD1.0668-1.143MM ID0.7874-0.8636MM

## (undated) DEVICE — SURGICAL PROCEDURE PACK VASC MAJ CUST

## (undated) DEVICE — GLOVE SURG SZ 75 L12IN FNGR THK94MIL TRNSLUC YEL LTX

## (undated) DEVICE — CATHETER URETH 22FR L16IN LTX INTMIT ROB MOD BARDX

## (undated) DEVICE — GOWN,SIRUS,FABRNF,L,20/CS: Brand: MEDLINE

## (undated) DEVICE — SET SURG INSTR ART III

## (undated) DEVICE — LOOP VES W25MM THK1MM MAXI RED SIL FLD REPELLENT 100 PER

## (undated) DEVICE — 24" (61 CM) ARTERIAL PRESSURE TUBING: Brand: ICU MEDICAL

## (undated) DEVICE — Z DISCONTINUED PER MEDLINE USE 2425483 TAPE UMB L30IN DIA1/8IN WHT COT NONABSORBABLE W/O NDL FOR

## (undated) DEVICE — STAPLER SKIN L39MM DIA0.53MM CRWN 5.7MM S STL FIX HD PROX

## (undated) DEVICE — GARMENT,MEDLINE,DVT,INT,CALF,MED, GEN2: Brand: MEDLINE

## (undated) DEVICE — NEEDLE HYPO 18GA L1.5IN PNK POLYPR HUB S STL REG BVL STR

## (undated) DEVICE — ADHESIVE SKIN CLOSURE TOP 36 CC HI VISC DERMBND MINI

## (undated) DEVICE — NEEDLE HYPO 25GA L0.625IN BLU POLYPR HUB S STL REG BVL STR